# Patient Record
Sex: MALE | Race: WHITE | Employment: OTHER | URBAN - METROPOLITAN AREA
[De-identification: names, ages, dates, MRNs, and addresses within clinical notes are randomized per-mention and may not be internally consistent; named-entity substitution may affect disease eponyms.]

---

## 2017-05-15 PROBLEM — Z87.09 H/O BRONCHITIS: Status: ACTIVE | Noted: 2017-05-15

## 2017-11-15 PROBLEM — I47.1 NARROW COMPLEX TACHYCARDIA (HCC): Status: ACTIVE | Noted: 2017-11-15

## 2018-01-01 ENCOUNTER — HOSPITAL ENCOUNTER (OUTPATIENT)
Dept: INFUSION THERAPY | Age: 83
Setting detail: INFUSION SERIES
Discharge: HOME | End: 2018-05-15
Payer: MEDICARE

## 2018-01-01 ENCOUNTER — APPOINTMENT (OUTPATIENT)
Dept: GENERAL RADIOLOGY | Age: 83
DRG: 871 | End: 2018-01-01
Payer: MEDICARE

## 2018-01-01 ENCOUNTER — HOSPITAL ENCOUNTER (OUTPATIENT)
Dept: INFUSION THERAPY | Age: 83
Setting detail: INFUSION SERIES
Discharge: HOME | End: 2018-05-08
Payer: MEDICARE

## 2018-01-01 ENCOUNTER — HOSPITAL ENCOUNTER (OUTPATIENT)
Dept: INFUSION THERAPY | Age: 83
Setting detail: INFUSION SERIES
Discharge: HOME | End: 2018-05-01
Payer: MEDICARE

## 2018-01-01 ENCOUNTER — OFFICE VISIT (OUTPATIENT)
Dept: CARDIOLOGY CLINIC | Age: 83
End: 2018-01-01
Payer: MEDICARE

## 2018-01-01 ENCOUNTER — APPOINTMENT (OUTPATIENT)
Dept: ULTRASOUND IMAGING | Age: 83
DRG: 871 | End: 2018-01-01
Payer: MEDICARE

## 2018-01-01 ENCOUNTER — HOSPITAL ENCOUNTER (OUTPATIENT)
Dept: INFUSION THERAPY | Age: 83
Setting detail: INFUSION SERIES
Discharge: HOME | End: 2018-05-29
Payer: MEDICARE

## 2018-01-01 ENCOUNTER — HOSPITAL ENCOUNTER (OUTPATIENT)
Dept: INFUSION THERAPY | Age: 83
Setting detail: INFUSION SERIES
Discharge: HOME | End: 2018-06-05
Payer: MEDICARE

## 2018-01-01 ENCOUNTER — ANESTHESIA (OUTPATIENT)
Dept: ENDOSCOPY | Age: 83
DRG: 871 | End: 2018-01-01
Payer: MEDICARE

## 2018-01-01 ENCOUNTER — HOSPITAL ENCOUNTER (OUTPATIENT)
Dept: INFUSION THERAPY | Age: 83
Setting detail: INFUSION SERIES
Discharge: HOME | End: 2018-04-17
Payer: MEDICARE

## 2018-01-01 ENCOUNTER — HOSPITAL ENCOUNTER (OUTPATIENT)
Dept: INFUSION THERAPY | Age: 83
Setting detail: INFUSION SERIES
Discharge: HOME | End: 2018-03-27
Payer: MEDICARE

## 2018-01-01 ENCOUNTER — HOSPITAL ENCOUNTER (OUTPATIENT)
Dept: INFUSION THERAPY | Age: 83
Setting detail: INFUSION SERIES
Discharge: HOME | End: 2018-04-24
Payer: MEDICARE

## 2018-01-01 ENCOUNTER — TELEPHONE (OUTPATIENT)
Dept: CARDIOLOGY CLINIC | Age: 83
End: 2018-01-01

## 2018-01-01 ENCOUNTER — HOSPITAL ENCOUNTER (OUTPATIENT)
Dept: INFUSION THERAPY | Age: 83
Setting detail: INFUSION SERIES
Discharge: HOME | End: 2018-06-19
Payer: MEDICARE

## 2018-01-01 ENCOUNTER — ANESTHESIA EVENT (OUTPATIENT)
Dept: ENDOSCOPY | Age: 83
DRG: 871 | End: 2018-01-01
Payer: MEDICARE

## 2018-01-01 ENCOUNTER — HOSPITAL ENCOUNTER (OUTPATIENT)
Dept: INFUSION THERAPY | Age: 83
Setting detail: INFUSION SERIES
Discharge: HOME | End: 2018-06-26
Payer: MEDICARE

## 2018-01-01 ENCOUNTER — HOSPITAL ENCOUNTER (OUTPATIENT)
Dept: INFUSION THERAPY | Age: 83
Setting detail: INFUSION SERIES
Discharge: HOME | End: 2018-06-12
Payer: MEDICARE

## 2018-01-01 ENCOUNTER — HOSPITAL ENCOUNTER (INPATIENT)
Age: 83
LOS: 11 days | Discharge: NURSING, REHAB OR LONG-TERM CARE | DRG: 871 | End: 2018-12-14
Attending: EMERGENCY MEDICINE | Admitting: INTERNAL MEDICINE
Payer: MEDICARE

## 2018-01-01 ENCOUNTER — HOSPITAL ENCOUNTER (OUTPATIENT)
Dept: INFUSION THERAPY | Age: 83
Setting detail: INFUSION SERIES
Discharge: HOME | End: 2018-04-03
Payer: MEDICARE

## 2018-01-01 ENCOUNTER — HOSPITAL ENCOUNTER (OUTPATIENT)
Dept: INFUSION THERAPY | Age: 83
Setting detail: INFUSION SERIES
Discharge: HOME | End: 2018-05-22
Payer: MEDICARE

## 2018-01-01 ENCOUNTER — HOSPITAL ENCOUNTER (OUTPATIENT)
Dept: INFUSION THERAPY | Age: 83
Setting detail: INFUSION SERIES
Discharge: HOME | End: 2018-07-10
Payer: MEDICARE

## 2018-01-01 ENCOUNTER — HOSPITAL ENCOUNTER (OUTPATIENT)
Dept: INFUSION THERAPY | Age: 83
Setting detail: INFUSION SERIES
Discharge: HOME | End: 2018-03-20
Payer: MEDICARE

## 2018-01-01 ENCOUNTER — APPOINTMENT (OUTPATIENT)
Dept: CT IMAGING | Age: 83
DRG: 871 | End: 2018-01-01
Payer: MEDICARE

## 2018-01-01 ENCOUNTER — HOSPITAL ENCOUNTER (OUTPATIENT)
Dept: INFUSION THERAPY | Age: 83
Setting detail: INFUSION SERIES
Discharge: HOME | End: 2018-07-03
Payer: MEDICARE

## 2018-01-01 ENCOUNTER — HOSPITAL ENCOUNTER (OUTPATIENT)
Dept: INFUSION THERAPY | Age: 83
Setting detail: INFUSION SERIES
Discharge: HOME | End: 2018-04-10
Payer: MEDICARE

## 2018-01-01 VITALS
OXYGEN SATURATION: 95 % | SYSTOLIC BLOOD PRESSURE: 121 MMHG | BODY MASS INDEX: 28.62 KG/M2 | TEMPERATURE: 98.2 F | OXYGEN SATURATION: 96 % | HEART RATE: 76 BPM | WEIGHT: 172 LBS | SYSTOLIC BLOOD PRESSURE: 116 MMHG | WEIGHT: 173 LBS | RESPIRATION RATE: 16 BRPM | DIASTOLIC BLOOD PRESSURE: 58 MMHG | HEIGHT: 65 IN | TEMPERATURE: 97.8 F | BODY MASS INDEX: 28.82 KG/M2 | HEART RATE: 76 BPM | DIASTOLIC BLOOD PRESSURE: 56 MMHG | RESPIRATION RATE: 16 BRPM

## 2018-01-01 VITALS
WEIGHT: 176 LBS | TEMPERATURE: 98.1 F | HEART RATE: 108 BPM | BODY MASS INDEX: 29.32 KG/M2 | RESPIRATION RATE: 16 BRPM | HEIGHT: 65 IN | SYSTOLIC BLOOD PRESSURE: 108 MMHG | DIASTOLIC BLOOD PRESSURE: 58 MMHG | OXYGEN SATURATION: 96 %

## 2018-01-01 VITALS
WEIGHT: 175.8 LBS | HEIGHT: 65 IN | RESPIRATION RATE: 18 BRPM | SYSTOLIC BLOOD PRESSURE: 140 MMHG | HEART RATE: 76 BPM | DIASTOLIC BLOOD PRESSURE: 60 MMHG | BODY MASS INDEX: 29.29 KG/M2

## 2018-01-01 VITALS
RESPIRATION RATE: 18 BRPM | OXYGEN SATURATION: 95 % | HEART RATE: 108 BPM | TEMPERATURE: 99.1 F | DIASTOLIC BLOOD PRESSURE: 72 MMHG | SYSTOLIC BLOOD PRESSURE: 153 MMHG | BODY MASS INDEX: 25.11 KG/M2 | WEIGHT: 175 LBS

## 2018-01-01 VITALS
RESPIRATION RATE: 16 BRPM | HEART RATE: 88 BPM | BODY MASS INDEX: 25.34 KG/M2 | OXYGEN SATURATION: 94 % | TEMPERATURE: 98.5 F | SYSTOLIC BLOOD PRESSURE: 155 MMHG | WEIGHT: 177 LBS | DIASTOLIC BLOOD PRESSURE: 70 MMHG | HEIGHT: 70 IN

## 2018-01-01 VITALS
DIASTOLIC BLOOD PRESSURE: 70 MMHG | SYSTOLIC BLOOD PRESSURE: 164 MMHG | OXYGEN SATURATION: 94 % | RESPIRATION RATE: 16 BRPM | TEMPERATURE: 97.5 F | HEART RATE: 74 BPM

## 2018-01-01 VITALS
DIASTOLIC BLOOD PRESSURE: 60 MMHG | HEART RATE: 80 BPM | BODY MASS INDEX: 25.97 KG/M2 | OXYGEN SATURATION: 95 % | HEIGHT: 70 IN | TEMPERATURE: 98.4 F | HEART RATE: 86 BPM | TEMPERATURE: 98.5 F | DIASTOLIC BLOOD PRESSURE: 65 MMHG | WEIGHT: 175 LBS | WEIGHT: 181 LBS | BODY MASS INDEX: 25.05 KG/M2 | RESPIRATION RATE: 16 BRPM | OXYGEN SATURATION: 96 % | SYSTOLIC BLOOD PRESSURE: 132 MMHG | SYSTOLIC BLOOD PRESSURE: 146 MMHG | RESPIRATION RATE: 16 BRPM

## 2018-01-01 VITALS
TEMPERATURE: 98.4 F | HEART RATE: 64 BPM | HEIGHT: 65 IN | DIASTOLIC BLOOD PRESSURE: 79 MMHG | WEIGHT: 176 LBS | OXYGEN SATURATION: 95 % | BODY MASS INDEX: 29.32 KG/M2 | SYSTOLIC BLOOD PRESSURE: 129 MMHG | RESPIRATION RATE: 16 BRPM

## 2018-01-01 VITALS
HEIGHT: 65 IN | WEIGHT: 184 LBS | SYSTOLIC BLOOD PRESSURE: 130 MMHG | BODY MASS INDEX: 30.66 KG/M2 | RESPIRATION RATE: 16 BRPM | HEART RATE: 67 BPM | DIASTOLIC BLOOD PRESSURE: 50 MMHG

## 2018-01-01 VITALS
HEART RATE: 74 BPM | SYSTOLIC BLOOD PRESSURE: 139 MMHG | OXYGEN SATURATION: 95 % | WEIGHT: 173 LBS | DIASTOLIC BLOOD PRESSURE: 60 MMHG | TEMPERATURE: 98 F | BODY MASS INDEX: 28.82 KG/M2 | RESPIRATION RATE: 16 BRPM | HEIGHT: 65 IN

## 2018-01-01 VITALS
BODY MASS INDEX: 25.34 KG/M2 | SYSTOLIC BLOOD PRESSURE: 138 MMHG | HEART RATE: 94 BPM | RESPIRATION RATE: 16 BRPM | OXYGEN SATURATION: 94 % | HEIGHT: 70 IN | DIASTOLIC BLOOD PRESSURE: 67 MMHG | WEIGHT: 177 LBS | TEMPERATURE: 97.8 F

## 2018-01-01 VITALS
HEART RATE: 76 BPM | RESPIRATION RATE: 16 BRPM | WEIGHT: 173 LBS | HEIGHT: 65 IN | OXYGEN SATURATION: 98 % | TEMPERATURE: 98.7 F | BODY MASS INDEX: 28.82 KG/M2 | SYSTOLIC BLOOD PRESSURE: 149 MMHG | DIASTOLIC BLOOD PRESSURE: 70 MMHG

## 2018-01-01 VITALS
SYSTOLIC BLOOD PRESSURE: 132 MMHG | DIASTOLIC BLOOD PRESSURE: 62 MMHG | OXYGEN SATURATION: 96 % | HEART RATE: 78 BPM | RESPIRATION RATE: 16 BRPM | BODY MASS INDEX: 28.82 KG/M2 | HEIGHT: 65 IN | TEMPERATURE: 98.9 F | WEIGHT: 173 LBS

## 2018-01-01 VITALS
BODY MASS INDEX: 25.25 KG/M2 | OXYGEN SATURATION: 92 % | RESPIRATION RATE: 16 BRPM | DIASTOLIC BLOOD PRESSURE: 76 MMHG | WEIGHT: 176 LBS | TEMPERATURE: 98.7 F | SYSTOLIC BLOOD PRESSURE: 190 MMHG | HEART RATE: 88 BPM

## 2018-01-01 VITALS
DIASTOLIC BLOOD PRESSURE: 63 MMHG | HEIGHT: 70 IN | HEART RATE: 80 BPM | BODY MASS INDEX: 25.91 KG/M2 | RESPIRATION RATE: 16 BRPM | OXYGEN SATURATION: 93 % | SYSTOLIC BLOOD PRESSURE: 140 MMHG | TEMPERATURE: 98.7 F | WEIGHT: 181 LBS

## 2018-01-01 VITALS
DIASTOLIC BLOOD PRESSURE: 71 MMHG | HEIGHT: 65 IN | OXYGEN SATURATION: 96 % | SYSTOLIC BLOOD PRESSURE: 159 MMHG | RESPIRATION RATE: 16 BRPM | WEIGHT: 173.6 LBS | BODY MASS INDEX: 28.92 KG/M2 | TEMPERATURE: 98.1 F | HEART RATE: 88 BPM

## 2018-01-01 VITALS
HEART RATE: 99 BPM | DIASTOLIC BLOOD PRESSURE: 51 MMHG | BODY MASS INDEX: 30.19 KG/M2 | HEIGHT: 65 IN | WEIGHT: 181.2 LBS | RESPIRATION RATE: 18 BRPM | OXYGEN SATURATION: 98 % | SYSTOLIC BLOOD PRESSURE: 82 MMHG | TEMPERATURE: 98.6 F

## 2018-01-01 VITALS
DIASTOLIC BLOOD PRESSURE: 39 MMHG | OXYGEN SATURATION: 90 % | RESPIRATION RATE: 16 BRPM | SYSTOLIC BLOOD PRESSURE: 79 MMHG

## 2018-01-01 DIAGNOSIS — Q60.0 CONGENITAL ABSENCE OF ONE KIDNEY: ICD-10-CM

## 2018-01-01 DIAGNOSIS — N18.6 ESRD ON PERITONEAL DIALYSIS (HCC): ICD-10-CM

## 2018-01-01 DIAGNOSIS — E88.09 HYPOALBUMINEMIA: ICD-10-CM

## 2018-01-01 DIAGNOSIS — J96.01 ACUTE RESPIRATORY FAILURE WITH HYPOXIA (HCC): Primary | ICD-10-CM

## 2018-01-01 DIAGNOSIS — R60.0 BILATERAL LOWER EXTREMITY EDEMA: ICD-10-CM

## 2018-01-01 DIAGNOSIS — I34.0 NON-RHEUMATIC MITRAL REGURGITATION: ICD-10-CM

## 2018-01-01 DIAGNOSIS — E78.2 MIXED HYPERLIPIDEMIA: ICD-10-CM

## 2018-01-01 DIAGNOSIS — N18.6 ANEMIA IN CHRONIC KIDNEY DISEASE, ON CHRONIC DIALYSIS (HCC): ICD-10-CM

## 2018-01-01 DIAGNOSIS — E11.22 TYPE 2 DIABETES MELLITUS WITH STAGE 4 CHRONIC KIDNEY DISEASE, WITHOUT LONG-TERM CURRENT USE OF INSULIN (HCC): ICD-10-CM

## 2018-01-01 DIAGNOSIS — N28.9 MALNUTRITION DUE TO RENAL DISEASE (HCC): ICD-10-CM

## 2018-01-01 DIAGNOSIS — I36.1 NON-RHEUMATIC TRICUSPID VALVE INSUFFICIENCY: ICD-10-CM

## 2018-01-01 DIAGNOSIS — D63.8 ANEMIA OF CHRONIC DISEASE: ICD-10-CM

## 2018-01-01 DIAGNOSIS — N18.6 ANEMIA DUE TO CHRONIC KIDNEY DISEASE, ON CHRONIC DIALYSIS (HCC): ICD-10-CM

## 2018-01-01 DIAGNOSIS — I35.0 NONRHEUMATIC AORTIC VALVE STENOSIS: ICD-10-CM

## 2018-01-01 DIAGNOSIS — J45.30 MILD PERSISTENT ASTHMA WITHOUT COMPLICATION: ICD-10-CM

## 2018-01-01 DIAGNOSIS — D63.1 ANEMIA IN CHRONIC KIDNEY DISEASE, ON CHRONIC DIALYSIS (HCC): ICD-10-CM

## 2018-01-01 DIAGNOSIS — N18.4 TYPE 2 DIABETES MELLITUS WITH STAGE 4 CHRONIC KIDNEY DISEASE, WITHOUT LONG-TERM CURRENT USE OF INSULIN (HCC): ICD-10-CM

## 2018-01-01 DIAGNOSIS — Z87.09 H/O BRONCHITIS: ICD-10-CM

## 2018-01-01 DIAGNOSIS — I35.1 NONRHEUMATIC AORTIC VALVE INSUFFICIENCY: ICD-10-CM

## 2018-01-01 DIAGNOSIS — I47.1 PSVT (PAROXYSMAL SUPRAVENTRICULAR TACHYCARDIA) (HCC): ICD-10-CM

## 2018-01-01 DIAGNOSIS — I10 ESSENTIAL HYPERTENSION: Primary | ICD-10-CM

## 2018-01-01 DIAGNOSIS — N25.81 SECONDARY HYPERPARATHYROIDISM (HCC): ICD-10-CM

## 2018-01-01 DIAGNOSIS — I47.1 PAROXYSMAL SUPRAVENTRICULAR TACHYCARDIA (HCC): ICD-10-CM

## 2018-01-01 DIAGNOSIS — Z99.2 ESRD ON PERITONEAL DIALYSIS (HCC): ICD-10-CM

## 2018-01-01 DIAGNOSIS — I27.20 PULMONARY HTN (HCC): ICD-10-CM

## 2018-01-01 DIAGNOSIS — I38 VHD (VALVULAR HEART DISEASE): Primary | ICD-10-CM

## 2018-01-01 DIAGNOSIS — J45.40 MODERATE PERSISTENT ASTHMA WITHOUT COMPLICATION: ICD-10-CM

## 2018-01-01 DIAGNOSIS — E88.09 HYPERALBUMINEMIA: ICD-10-CM

## 2018-01-01 DIAGNOSIS — E66.9 MILD OBESITY: ICD-10-CM

## 2018-01-01 DIAGNOSIS — I38 VHD (VALVULAR HEART DISEASE): ICD-10-CM

## 2018-01-01 DIAGNOSIS — I48.91 ATRIAL FIBRILLATION WITH RAPID VENTRICULAR RESPONSE (HCC): ICD-10-CM

## 2018-01-01 DIAGNOSIS — Z99.2 ANEMIA DUE TO CHRONIC KIDNEY DISEASE, ON CHRONIC DIALYSIS (HCC): ICD-10-CM

## 2018-01-01 DIAGNOSIS — I10 ESSENTIAL HYPERTENSION: ICD-10-CM

## 2018-01-01 DIAGNOSIS — I47.1 NARROW COMPLEX TACHYCARDIA (HCC): ICD-10-CM

## 2018-01-01 DIAGNOSIS — D63.1 ANEMIA DUE TO CHRONIC KIDNEY DISEASE, ON CHRONIC DIALYSIS (HCC): ICD-10-CM

## 2018-01-01 DIAGNOSIS — Z99.2 ANEMIA IN CHRONIC KIDNEY DISEASE, ON CHRONIC DIALYSIS (HCC): ICD-10-CM

## 2018-01-01 DIAGNOSIS — E46 MALNUTRITION DUE TO RENAL DISEASE (HCC): ICD-10-CM

## 2018-01-01 LAB
ACETAMINOPHEN LEVEL: <5 MCG/ML (ref 10–30)
ALBUMIN SERPL-MCNC: 2 G/DL (ref 3.5–5.2)
ALBUMIN SERPL-MCNC: 2 G/DL (ref 3.5–5.2)
ALBUMIN SERPL-MCNC: 2.1 G/DL (ref 3.5–5.2)
ALBUMIN SERPL-MCNC: 2.1 G/DL (ref 3.5–5.2)
ALBUMIN SERPL-MCNC: 2.4 G/DL (ref 3.5–5.2)
ALBUMIN SERPL-MCNC: 2.4 G/DL (ref 3.5–5.2)
ALBUMIN SERPL-MCNC: 2.5 G/DL (ref 3.5–5.2)
ALBUMIN SERPL-MCNC: 2.6 G/DL (ref 3.5–5.2)
ALBUMIN SERPL-MCNC: 2.6 G/DL (ref 3.5–5.2)
ALBUMIN SERPL-MCNC: 2.7 G/DL (ref 3.5–5.2)
ALBUMIN SERPL-MCNC: 2.7 G/DL (ref 3.5–5.2)
ALBUMIN SERPL-MCNC: 2.8 G/DL (ref 3.5–5.2)
ALP BLD-CCNC: 51 U/L (ref 40–129)
ALP BLD-CCNC: 61 U/L (ref 40–129)
ALP BLD-CCNC: 64 U/L (ref 40–129)
ALP BLD-CCNC: 67 U/L (ref 40–129)
ALP BLD-CCNC: 67 U/L (ref 40–129)
ALP BLD-CCNC: 70 U/L (ref 40–129)
ALP BLD-CCNC: 72 U/L (ref 40–129)
ALP BLD-CCNC: 73 U/L (ref 40–129)
ALP BLD-CCNC: 82 U/L (ref 40–129)
ALT SERPL-CCNC: 10 U/L (ref 0–40)
ALT SERPL-CCNC: 11 U/L (ref 0–40)
ALT SERPL-CCNC: 11 U/L (ref 0–40)
ALT SERPL-CCNC: 12 U/L (ref 0–40)
ALT SERPL-CCNC: 12 U/L (ref 0–40)
ALT SERPL-CCNC: 13 U/L (ref 0–40)
ALT SERPL-CCNC: 16 U/L (ref 0–40)
ALT SERPL-CCNC: 17 U/L (ref 0–40)
AMORPHOUS: ABNORMAL
ANION GAP SERPL CALCULATED.3IONS-SCNC: 14 MMOL/L (ref 7–16)
ANION GAP SERPL CALCULATED.3IONS-SCNC: 14 MMOL/L (ref 7–16)
ANION GAP SERPL CALCULATED.3IONS-SCNC: 15 MMOL/L (ref 7–16)
ANION GAP SERPL CALCULATED.3IONS-SCNC: 16 MMOL/L (ref 7–16)
ANION GAP SERPL CALCULATED.3IONS-SCNC: 17 MMOL/L (ref 7–16)
ANION GAP SERPL CALCULATED.3IONS-SCNC: 17 MMOL/L (ref 7–16)
ANION GAP SERPL CALCULATED.3IONS-SCNC: 18 MMOL/L (ref 7–16)
ANION GAP SERPL CALCULATED.3IONS-SCNC: 18 MMOL/L (ref 7–16)
ANION GAP SERPL CALCULATED.3IONS-SCNC: 21 MMOL/L (ref 7–16)
ANISOCYTOSIS: ABNORMAL
APPEARANCE FLUID: CLEAR
APPEARANCE FLUID: CLEAR
APTT: 28.5 SEC (ref 24.5–35.1)
AST SERPL-CCNC: 14 U/L (ref 0–39)
AST SERPL-CCNC: 16 U/L (ref 0–39)
AST SERPL-CCNC: 17 U/L (ref 0–39)
AST SERPL-CCNC: 22 U/L (ref 0–39)
AST SERPL-CCNC: 5 U/L (ref 0–39)
AST SERPL-CCNC: 6 U/L (ref 0–39)
AST SERPL-CCNC: 6 U/L (ref 0–39)
AST SERPL-CCNC: 7 U/L (ref 0–39)
AST SERPL-CCNC: 8 U/L (ref 0–39)
AST SERPL-CCNC: 8 U/L (ref 0–39)
AST SERPL-CCNC: 9 U/L (ref 0–39)
AST SERPL-CCNC: 9 U/L (ref 0–39)
B.E.: -4.9 MMOL/L (ref -3–3)
BACTERIA: ABNORMAL /HPF
BASO FLUID: 0 %
BASOPHILIC STIPPLING: ABNORMAL
BASOPHILIC STIPPLING: ABNORMAL
BASOPHILS ABSOLUTE: 0 E9/L (ref 0–0.2)
BASOPHILS ABSOLUTE: 0.02 E9/L (ref 0–0.2)
BASOPHILS ABSOLUTE: 0.03 E9/L (ref 0–0.2)
BASOPHILS ABSOLUTE: 0.03 E9/L (ref 0–0.2)
BASOPHILS ABSOLUTE: 0.04 E9/L (ref 0–0.2)
BASOPHILS ABSOLUTE: 0.06 E9/L (ref 0–0.2)
BASOPHILS ABSOLUTE: 0.09 E9/L (ref 0–0.2)
BASOPHILS RELATIVE PERCENT: 0.1 % (ref 0–2)
BASOPHILS RELATIVE PERCENT: 0.1 % (ref 0–2)
BASOPHILS RELATIVE PERCENT: 0.2 % (ref 0–2)
BASOPHILS RELATIVE PERCENT: 0.3 % (ref 0–2)
BASOPHILS RELATIVE PERCENT: 0.9 % (ref 0–2)
BILIRUB SERPL-MCNC: 0.2 MG/DL (ref 0–1.2)
BILIRUB SERPL-MCNC: 0.3 MG/DL (ref 0–1.2)
BILIRUB SERPL-MCNC: 0.4 MG/DL (ref 0–1.2)
BILIRUBIN URINE: NEGATIVE
BLOOD CULTURE, ROUTINE: NORMAL
BLOOD, URINE: ABNORMAL
BODY FLUID CULTURE, STERILE: NORMAL
BUN BLDV-MCNC: 65 MG/DL (ref 8–23)
BUN BLDV-MCNC: 66 MG/DL (ref 8–23)
BUN BLDV-MCNC: 67 MG/DL (ref 8–23)
BUN BLDV-MCNC: 68 MG/DL (ref 8–23)
BUN BLDV-MCNC: 69 MG/DL (ref 8–23)
BUN BLDV-MCNC: 69 MG/DL (ref 8–23)
BUN BLDV-MCNC: 71 MG/DL (ref 8–23)
BUN BLDV-MCNC: 71 MG/DL (ref 8–23)
BUN BLDV-MCNC: 72 MG/DL (ref 8–23)
BUN BLDV-MCNC: 74 MG/DL (ref 8–23)
BUN BLDV-MCNC: 74 MG/DL (ref 8–23)
BUN BLDV-MCNC: 86 MG/DL (ref 8–23)
BUN BLDV-MCNC: 95 MG/DL (ref 8–23)
CALCIUM SERPL-MCNC: 8 MG/DL (ref 8.6–10.2)
CALCIUM SERPL-MCNC: 8 MG/DL (ref 8.6–10.2)
CALCIUM SERPL-MCNC: 8.2 MG/DL (ref 8.6–10.2)
CALCIUM SERPL-MCNC: 8.3 MG/DL (ref 8.6–10.2)
CALCIUM SERPL-MCNC: 8.4 MG/DL (ref 8.6–10.2)
CALCIUM SERPL-MCNC: 8.4 MG/DL (ref 8.6–10.2)
CALCIUM SERPL-MCNC: 8.5 MG/DL (ref 8.6–10.2)
CALCIUM SERPL-MCNC: 8.5 MG/DL (ref 8.6–10.2)
CALCIUM SERPL-MCNC: 8.6 MG/DL (ref 8.6–10.2)
CALCIUM SERPL-MCNC: 8.8 MG/DL (ref 8.6–10.2)
CALCIUM SERPL-MCNC: 8.8 MG/DL (ref 8.6–10.2)
CELL COUNT FLUID TYPE: NORMAL
CELL COUNT FLUID TYPE: NORMAL
CHLORIDE BLD-SCNC: 87 MMOL/L (ref 98–107)
CHLORIDE BLD-SCNC: 90 MMOL/L (ref 98–107)
CHLORIDE BLD-SCNC: 91 MMOL/L (ref 98–107)
CHLORIDE BLD-SCNC: 92 MMOL/L (ref 98–107)
CHLORIDE BLD-SCNC: 93 MMOL/L (ref 98–107)
CHLORIDE BLD-SCNC: 94 MMOL/L (ref 98–107)
CHLORIDE BLD-SCNC: 94 MMOL/L (ref 98–107)
CHLORIDE BLD-SCNC: 95 MMOL/L (ref 98–107)
CHLORIDE BLD-SCNC: 96 MMOL/L (ref 98–107)
CHOLESTEROL, TOTAL: 104 MG/DL (ref 0–199)
CK MB: 2.8 NG/ML (ref 0–7.7)
CK MB: 2.9 NG/ML (ref 0–7.7)
CLARITY: ABNORMAL
CO2: 22 MMOL/L (ref 22–29)
CO2: 23 MMOL/L (ref 22–29)
CO2: 23 MMOL/L (ref 22–29)
CO2: 24 MMOL/L (ref 22–29)
CO2: 24 MMOL/L (ref 22–29)
CO2: 26 MMOL/L (ref 22–29)
CO2: 27 MMOL/L (ref 22–29)
COHB: 0.5 % (ref 0–1.5)
COLOR FLUID: COLORLESS
COLOR FLUID: NORMAL
COLOR: YELLOW
COMMENT: NORMAL
CORTISOL TOTAL: 27.59 MCG/DL (ref 2.68–18.4)
CORTISOL TOTAL: 28.62 MCG/DL (ref 2.68–18.4)
CREAT SERPL-MCNC: 6.2 MG/DL (ref 0.7–1.2)
CREAT SERPL-MCNC: 6.4 MG/DL (ref 0.7–1.2)
CREAT SERPL-MCNC: 6.5 MG/DL (ref 0.7–1.2)
CREAT SERPL-MCNC: 6.5 MG/DL (ref 0.7–1.2)
CREAT SERPL-MCNC: 6.6 MG/DL (ref 0.7–1.2)
CREAT SERPL-MCNC: 6.8 MG/DL (ref 0.7–1.2)
CREAT SERPL-MCNC: 6.8 MG/DL (ref 0.7–1.2)
CREAT SERPL-MCNC: 7.1 MG/DL (ref 0.7–1.2)
CREAT SERPL-MCNC: 7.2 MG/DL (ref 0.7–1.2)
CREAT SERPL-MCNC: 7.3 MG/DL (ref 0.7–1.2)
CREAT SERPL-MCNC: 7.4 MG/DL (ref 0.7–1.2)
CREAT SERPL-MCNC: 7.4 MG/DL (ref 0.7–1.2)
CREAT SERPL-MCNC: 7.9 MG/DL (ref 0.7–1.2)
CRITICAL: ABNORMAL
CULTURE CATHETER TIP: NORMAL
CULTURE, BLOOD 2: ABNORMAL
CULTURE, BLOOD 2: ABNORMAL
CULTURE, BLOOD 2: NORMAL
CULTURE, RESPIRATORY: ABNORMAL
CULTURE, RESPIRATORY: ABNORMAL
DATE ANALYZED: ABNORMAL
DATE OF COLLECTION: ABNORMAL
DOHLE BODIES: ABNORMAL
EKG ATRIAL RATE: 115 BPM
EKG ATRIAL RATE: 133 BPM
EKG Q-T INTERVAL: 326 MS
EKG Q-T INTERVAL: 346 MS
EKG QRS DURATION: 112 MS
EKG QRS DURATION: 114 MS
EKG QTC CALCULATION (BAZETT): 450 MS
EKG QTC CALCULATION (BAZETT): 460 MS
EKG R AXIS: -25 DEGREES
EKG R AXIS: 0 DEGREES
EKG T AXIS: 67 DEGREES
EKG T AXIS: 81 DEGREES
EKG VENTRICULAR RATE: 102 BPM
EKG VENTRICULAR RATE: 120 BPM
EOSINOPHIL FLUID: 0 %
EOSINOPHILS ABSOLUTE: 0 E9/L (ref 0.05–0.5)
EOSINOPHILS ABSOLUTE: 0.09 E9/L (ref 0.05–0.5)
EOSINOPHILS ABSOLUTE: 0.1 E9/L (ref 0.05–0.5)
EOSINOPHILS ABSOLUTE: 0.11 E9/L (ref 0.05–0.5)
EOSINOPHILS ABSOLUTE: 0.11 E9/L (ref 0.05–0.5)
EOSINOPHILS ABSOLUTE: 0.12 E9/L (ref 0.05–0.5)
EOSINOPHILS ABSOLUTE: 0.13 E9/L (ref 0.05–0.5)
EOSINOPHILS ABSOLUTE: 0.18 E9/L (ref 0.05–0.5)
EOSINOPHILS ABSOLUTE: 0.2 E9/L (ref 0.05–0.5)
EOSINOPHILS RELATIVE PERCENT: 0 % (ref 0–6)
EOSINOPHILS RELATIVE PERCENT: 0.1 % (ref 0–6)
EOSINOPHILS RELATIVE PERCENT: 0.2 % (ref 0–6)
EOSINOPHILS RELATIVE PERCENT: 0.5 % (ref 0–6)
EOSINOPHILS RELATIVE PERCENT: 0.7 % (ref 0–6)
EOSINOPHILS RELATIVE PERCENT: 0.8 % (ref 0–6)
EOSINOPHILS RELATIVE PERCENT: 0.9 % (ref 0–6)
EOSINOPHILS RELATIVE PERCENT: 1 % (ref 0–6)
EOSINOPHILS RELATIVE PERCENT: 1.7 % (ref 0–6)
EPITHELIAL CELLS, UA: ABNORMAL /HPF
ETHANOL: <10 MG/DL (ref 0–0.08)
FERRITIN: 1232 NG/ML
FILM ARRAY ADENOVIRUS: NORMAL
FILM ARRAY BORDETELLA PERTUSSIS: NORMAL
FILM ARRAY CHLAMYDOPHILIA PNEUMONIAE: NORMAL
FILM ARRAY CORONAVIRUS 229E: NORMAL
FILM ARRAY CORONAVIRUS HKU1: NORMAL
FILM ARRAY CORONAVIRUS NL63: NORMAL
FILM ARRAY CORONAVIRUS OC43: NORMAL
FILM ARRAY INFLUENZA A VIRUS 09H1: NORMAL
FILM ARRAY INFLUENZA A VIRUS H1: NORMAL
FILM ARRAY INFLUENZA A VIRUS H3: NORMAL
FILM ARRAY INFLUENZA A VIRUS: NORMAL
FILM ARRAY INFLUENZA B: NORMAL
FILM ARRAY METAPNEUMOVIRUS: NORMAL
FILM ARRAY MYCOPLASMA PNEUMONIAE: NORMAL
FILM ARRAY PARAINFLUENZA VIRUS 1: NORMAL
FILM ARRAY PARAINFLUENZA VIRUS 2: NORMAL
FILM ARRAY PARAINFLUENZA VIRUS 3: NORMAL
FILM ARRAY PARAINFLUENZA VIRUS 4: NORMAL
FILM ARRAY RESPIRATORY SYNCITIAL VIRUS: NORMAL
FILM ARRAY RHINOVIRUS/ENTEROVIRUS: NORMAL
GFR AFRICAN AMERICAN: 10
GFR AFRICAN AMERICAN: 8
GFR AFRICAN AMERICAN: 9
GFR NON-AFRICAN AMERICAN: 7 ML/MIN/1.73
GFR NON-AFRICAN AMERICAN: 8 ML/MIN/1.73
GFR NON-AFRICAN AMERICAN: 9 ML/MIN/1.73
GLUCOSE BLD-MCNC: 159 MG/DL (ref 74–99)
GLUCOSE BLD-MCNC: 162 MG/DL (ref 74–99)
GLUCOSE BLD-MCNC: 177 MG/DL (ref 74–99)
GLUCOSE BLD-MCNC: 181 MG/DL (ref 74–99)
GLUCOSE BLD-MCNC: 191 MG/DL (ref 74–99)
GLUCOSE BLD-MCNC: 232 MG/DL (ref 74–99)
GLUCOSE BLD-MCNC: 233 MG/DL (ref 74–99)
GLUCOSE BLD-MCNC: 253 MG/DL (ref 74–99)
GLUCOSE BLD-MCNC: 262 MG/DL (ref 74–99)
GLUCOSE BLD-MCNC: 263 MG/DL (ref 74–99)
GLUCOSE BLD-MCNC: 265 MG/DL (ref 74–99)
GLUCOSE BLD-MCNC: 289 MG/DL (ref 74–99)
GLUCOSE BLD-MCNC: 368 MG/DL (ref 74–99)
GLUCOSE URINE: 250 MG/DL
GRAM STAIN ORDERABLE: NORMAL
GRAM STAIN RESULT: NORMAL
HAV IGM SER IA-ACNC: NORMAL
HBA1C MFR BLD: 6.9 % (ref 4–5.6)
HBV SURFACE AB TITR SER: NORMAL {TITER}
HCO3: 19.3 MMOL/L (ref 22–26)
HCT VFR BLD CALC: 26.9 % (ref 37–54)
HCT VFR BLD CALC: 27.5 % (ref 37–54)
HCT VFR BLD CALC: 28.5 % (ref 37–54)
HCT VFR BLD CALC: 28.6 % (ref 37–54)
HCT VFR BLD CALC: 28.7 % (ref 37–54)
HCT VFR BLD CALC: 29 % (ref 37–54)
HCT VFR BLD CALC: 29.3 % (ref 37–54)
HCT VFR BLD CALC: 29.6 % (ref 37–54)
HCT VFR BLD CALC: 29.6 % (ref 37–54)
HCT VFR BLD CALC: 29.7 % (ref 37–54)
HCT VFR BLD CALC: 30.3 % (ref 37–54)
HCT VFR BLD CALC: 31.1 % (ref 37–54)
HCT VFR BLD CALC: 34.7 % (ref 37–54)
HDLC SERPL-MCNC: 62 MG/DL
HEMOGLOBIN: 11.5 G/DL (ref 12.5–16.5)
HEMOGLOBIN: 8 G/DL (ref 12.5–16.5)
HEMOGLOBIN: 8.4 G/DL (ref 12.5–16.5)
HEMOGLOBIN: 8.8 G/DL (ref 12.5–16.5)
HEMOGLOBIN: 8.9 G/DL (ref 12.5–16.5)
HEMOGLOBIN: 9.1 G/DL (ref 12.5–16.5)
HEMOGLOBIN: 9.2 G/DL (ref 12.5–16.5)
HEMOGLOBIN: 9.3 G/DL (ref 12.5–16.5)
HEMOGLOBIN: 9.4 G/DL (ref 12.5–16.5)
HEMOGLOBIN: 9.4 G/DL (ref 12.5–16.5)
HEMOGLOBIN: 9.7 G/DL (ref 12.5–16.5)
HEPATITIS B CORE IGM ANTIBODY: NORMAL
HEPATITIS B SURFACE ANTIGEN INTERPRETATION: NORMAL
HEPATITIS C ANTIBODY INTERPRETATION: NORMAL
HHB: 1.4 % (ref 0–5)
HYPOCHROMIA: ABNORMAL
IMMATURE GRANULOCYTES #: 0.09 E9/L
IMMATURE GRANULOCYTES #: 0.13 E9/L
IMMATURE GRANULOCYTES #: 0.13 E9/L
IMMATURE GRANULOCYTES #: 0.18 E9/L
IMMATURE GRANULOCYTES #: 0.24 E9/L
IMMATURE GRANULOCYTES %: 0.7 % (ref 0–5)
IMMATURE GRANULOCYTES %: 0.9 % (ref 0–5)
IMMATURE GRANULOCYTES %: 1 % (ref 0–5)
IMMATURE GRANULOCYTES %: 1.1 % (ref 0–5)
IMMATURE GRANULOCYTES %: 1.3 % (ref 0–5)
INR BLD: 1.1
INR BLD: 1.2
INR BLD: 1.2
INR BLD: 1.3
IRON SATURATION: 31 % (ref 20–55)
IRON: 37 MCG/DL (ref 59–158)
KETONES, URINE: NEGATIVE MG/DL
L. PNEUMOPHILA SEROGP 1 UR AG: NORMAL
LAB: ABNORMAL
LACTIC ACID: 1.7 MMOL/L (ref 0.5–2.2)
LACTIC ACID: 2.1 MMOL/L (ref 0.5–2.2)
LACTIC ACID: 2.2 MMOL/L (ref 0.5–2.2)
LACTIC ACID: 2.7 MMOL/L (ref 0.5–2.2)
LACTIC ACID: 2.7 MMOL/L (ref 0.5–2.2)
LACTIC ACID: 2.9 MMOL/L (ref 0.5–2.2)
LACTIC ACID: 3.9 MMOL/L (ref 0.5–2.2)
LDL CHOLESTEROL CALCULATED: 26 MG/DL (ref 0–99)
LEUKOCYTE ESTERASE, URINE: NEGATIVE
LIPASE: 25 U/L (ref 13–60)
LV EF: 55 %
LVEF MODALITY: NORMAL
LYMPHOCYTES ABSOLUTE: 0.82 E9/L (ref 1.5–4)
LYMPHOCYTES ABSOLUTE: 0.98 E9/L (ref 1.5–4)
LYMPHOCYTES ABSOLUTE: 1.37 E9/L (ref 1.5–4)
LYMPHOCYTES ABSOLUTE: 1.61 E9/L (ref 1.5–4)
LYMPHOCYTES ABSOLUTE: 1.71 E9/L (ref 1.5–4)
LYMPHOCYTES ABSOLUTE: 1.74 E9/L (ref 1.5–4)
LYMPHOCYTES ABSOLUTE: 1.78 E9/L (ref 1.5–4)
LYMPHOCYTES ABSOLUTE: 1.79 E9/L (ref 1.5–4)
LYMPHOCYTES ABSOLUTE: 1.84 E9/L (ref 1.5–4)
LYMPHOCYTES ABSOLUTE: 2.04 E9/L (ref 1.5–4)
LYMPHOCYTES ABSOLUTE: 2.04 E9/L (ref 1.5–4)
LYMPHOCYTES ABSOLUTE: 2.24 E9/L (ref 1.5–4)
LYMPHOCYTES ABSOLUTE: 2.4 E9/L (ref 1.5–4)
LYMPHOCYTES RELATIVE PERCENT: 11 % (ref 20–42)
LYMPHOCYTES RELATIVE PERCENT: 11.4 % (ref 20–42)
LYMPHOCYTES RELATIVE PERCENT: 11.9 % (ref 20–42)
LYMPHOCYTES RELATIVE PERCENT: 12.2 % (ref 20–42)
LYMPHOCYTES RELATIVE PERCENT: 12.3 % (ref 20–42)
LYMPHOCYTES RELATIVE PERCENT: 13.1 % (ref 20–42)
LYMPHOCYTES RELATIVE PERCENT: 13.2 % (ref 20–42)
LYMPHOCYTES RELATIVE PERCENT: 13.9 % (ref 20–42)
LYMPHOCYTES RELATIVE PERCENT: 16.3 % (ref 20–42)
LYMPHOCYTES RELATIVE PERCENT: 5.2 % (ref 20–42)
LYMPHOCYTES RELATIVE PERCENT: 6.1 % (ref 20–42)
LYMPHOCYTES RELATIVE PERCENT: 7.8 % (ref 20–42)
LYMPHOCYTES RELATIVE PERCENT: 7.9 % (ref 20–42)
LYMPHOCYTES, BODY FLUID: 8 %
Lab: ABNORMAL
MAGNESIUM: 1.7 MG/DL (ref 1.6–2.6)
MAGNESIUM: 1.7 MG/DL (ref 1.6–2.6)
MAGNESIUM: 1.8 MG/DL (ref 1.6–2.6)
MAGNESIUM: 1.9 MG/DL (ref 1.6–2.6)
MAGNESIUM: 1.9 MG/DL (ref 1.6–2.6)
MAGNESIUM: 2 MG/DL (ref 1.6–2.6)
MAGNESIUM: 2.1 MG/DL (ref 1.6–2.6)
MAGNESIUM: 2.2 MG/DL (ref 1.6–2.6)
MAGNESIUM: 2.3 MG/DL (ref 1.6–2.6)
MCH RBC QN AUTO: 30.3 PG (ref 26–35)
MCH RBC QN AUTO: 30.4 PG (ref 26–35)
MCH RBC QN AUTO: 30.5 PG (ref 26–35)
MCH RBC QN AUTO: 30.5 PG (ref 26–35)
MCH RBC QN AUTO: 30.7 PG (ref 26–35)
MCH RBC QN AUTO: 31.2 PG (ref 26–35)
MCHC RBC AUTO-ENTMCNC: 29.5 % (ref 32–34.5)
MCHC RBC AUTO-ENTMCNC: 29.7 % (ref 32–34.5)
MCHC RBC AUTO-ENTMCNC: 29.7 % (ref 32–34.5)
MCHC RBC AUTO-ENTMCNC: 30 % (ref 32–34.5)
MCHC RBC AUTO-ENTMCNC: 30.1 % (ref 32–34.5)
MCHC RBC AUTO-ENTMCNC: 30.2 % (ref 32–34.5)
MCHC RBC AUTO-ENTMCNC: 31 % (ref 32–34.5)
MCHC RBC AUTO-ENTMCNC: 31.7 % (ref 32–34.5)
MCHC RBC AUTO-ENTMCNC: 31.8 % (ref 32–34.5)
MCHC RBC AUTO-ENTMCNC: 32.4 % (ref 32–34.5)
MCHC RBC AUTO-ENTMCNC: 32.4 % (ref 32–34.5)
MCHC RBC AUTO-ENTMCNC: 33.1 % (ref 32–34.5)
MCHC RBC AUTO-ENTMCNC: 33.1 % (ref 32–34.5)
MCV RBC AUTO: 100.3 FL (ref 80–99.9)
MCV RBC AUTO: 101.4 FL (ref 80–99.9)
MCV RBC AUTO: 101.7 FL (ref 80–99.9)
MCV RBC AUTO: 102.1 FL (ref 80–99.9)
MCV RBC AUTO: 102.3 FL (ref 80–99.9)
MCV RBC AUTO: 102.9 FL (ref 80–99.9)
MCV RBC AUTO: 92.5 FL (ref 80–99.9)
MCV RBC AUTO: 94.2 FL (ref 80–99.9)
MCV RBC AUTO: 94.7 FL (ref 80–99.9)
MCV RBC AUTO: 94.8 FL (ref 80–99.9)
MCV RBC AUTO: 95.3 FL (ref 80–99.9)
MCV RBC AUTO: 95.7 FL (ref 80–99.9)
MCV RBC AUTO: 98.1 FL (ref 80–99.9)
METAMYELOCYTES RELATIVE PERCENT: 1.8 % (ref 0–1)
METER GLUCOSE: 114 MG/DL (ref 74–99)
METER GLUCOSE: 118 MG/DL (ref 74–99)
METER GLUCOSE: 122 MG/DL (ref 74–99)
METER GLUCOSE: 124 MG/DL (ref 74–99)
METER GLUCOSE: 136 MG/DL (ref 74–99)
METER GLUCOSE: 137 MG/DL (ref 74–99)
METER GLUCOSE: 138 MG/DL (ref 74–99)
METER GLUCOSE: 148 MG/DL (ref 74–99)
METER GLUCOSE: 149 MG/DL (ref 74–99)
METER GLUCOSE: 152 MG/DL (ref 74–99)
METER GLUCOSE: 153 MG/DL (ref 74–99)
METER GLUCOSE: 153 MG/DL (ref 74–99)
METER GLUCOSE: 155 MG/DL (ref 74–99)
METER GLUCOSE: 156 MG/DL (ref 74–99)
METER GLUCOSE: 161 MG/DL (ref 74–99)
METER GLUCOSE: 161 MG/DL (ref 74–99)
METER GLUCOSE: 165 MG/DL (ref 74–99)
METER GLUCOSE: 168 MG/DL (ref 74–99)
METER GLUCOSE: 169 MG/DL (ref 74–99)
METER GLUCOSE: 171 MG/DL (ref 74–99)
METER GLUCOSE: 175 MG/DL (ref 74–99)
METER GLUCOSE: 177 MG/DL (ref 74–99)
METER GLUCOSE: 180 MG/DL (ref 74–99)
METER GLUCOSE: 180 MG/DL (ref 74–99)
METER GLUCOSE: 181 MG/DL (ref 74–99)
METER GLUCOSE: 183 MG/DL (ref 74–99)
METER GLUCOSE: 192 MG/DL (ref 74–99)
METER GLUCOSE: 193 MG/DL (ref 74–99)
METER GLUCOSE: 195 MG/DL (ref 74–99)
METER GLUCOSE: 201 MG/DL (ref 74–99)
METER GLUCOSE: 202 MG/DL (ref 74–99)
METER GLUCOSE: 207 MG/DL (ref 74–99)
METER GLUCOSE: 214 MG/DL (ref 74–99)
METER GLUCOSE: 215 MG/DL (ref 74–99)
METER GLUCOSE: 216 MG/DL (ref 74–99)
METER GLUCOSE: 220 MG/DL (ref 74–99)
METER GLUCOSE: 224 MG/DL (ref 74–99)
METER GLUCOSE: 227 MG/DL (ref 74–99)
METER GLUCOSE: 228 MG/DL (ref 74–99)
METER GLUCOSE: 232 MG/DL (ref 74–99)
METER GLUCOSE: 234 MG/DL (ref 74–99)
METER GLUCOSE: 236 MG/DL (ref 74–99)
METER GLUCOSE: 254 MG/DL (ref 74–99)
METER GLUCOSE: 273 MG/DL (ref 74–99)
METER GLUCOSE: 276 MG/DL (ref 74–99)
METER GLUCOSE: 321 MG/DL (ref 74–99)
METER GLUCOSE: 72 MG/DL (ref 74–99)
METHB: 0.1 % (ref 0–1.5)
MODE: ABNORMAL
MONOCYTE, FLUID: 65 %
MONOCYTE, FLUID: 67 %
MONOCYTES ABSOLUTE: 0 E9/L (ref 0.1–0.95)
MONOCYTES ABSOLUTE: 0.51 E9/L (ref 0.1–0.95)
MONOCYTES ABSOLUTE: 0.58 E9/L (ref 0.1–0.95)
MONOCYTES ABSOLUTE: 0.65 E9/L (ref 0.1–0.95)
MONOCYTES ABSOLUTE: 0.82 E9/L (ref 0.1–0.95)
MONOCYTES ABSOLUTE: 0.9 E9/L (ref 0.1–0.95)
MONOCYTES ABSOLUTE: 1.01 E9/L (ref 0.1–0.95)
MONOCYTES ABSOLUTE: 1.07 E9/L (ref 0.1–0.95)
MONOCYTES ABSOLUTE: 1.13 E9/L (ref 0.1–0.95)
MONOCYTES ABSOLUTE: 1.25 E9/L (ref 0.1–0.95)
MONOCYTES ABSOLUTE: 1.28 E9/L (ref 0.1–0.95)
MONOCYTES ABSOLUTE: 1.41 E9/L (ref 0.1–0.95)
MONOCYTES ABSOLUTE: 1.52 E9/L (ref 0.1–0.95)
MONOCYTES RELATIVE PERCENT: 1.8 % (ref 2–12)
MONOCYTES RELATIVE PERCENT: 10.4 % (ref 2–12)
MONOCYTES RELATIVE PERCENT: 11.3 % (ref 2–12)
MONOCYTES RELATIVE PERCENT: 2.6 % (ref 2–12)
MONOCYTES RELATIVE PERCENT: 2.6 % (ref 2–12)
MONOCYTES RELATIVE PERCENT: 2.7 % (ref 2–12)
MONOCYTES RELATIVE PERCENT: 6.4 % (ref 2–12)
MONOCYTES RELATIVE PERCENT: 7.4 % (ref 2–12)
MONOCYTES RELATIVE PERCENT: 7.5 % (ref 2–12)
MONOCYTES RELATIVE PERCENT: 7.8 % (ref 2–12)
MONOCYTES RELATIVE PERCENT: 7.9 % (ref 2–12)
MONOCYTES RELATIVE PERCENT: 8.3 % (ref 2–12)
MONOCYTES RELATIVE PERCENT: 8.6 % (ref 2–12)
MYCOPLASMA PNEUMONIAE IGM: NORMAL
MYELOCYTE PERCENT: 0.9 % (ref 0–0)
NEUTROPHIL, FLUID: 27 %
NEUTROPHIL, FLUID: 33 %
NEUTROPHILS ABSOLUTE: 10.02 E9/L (ref 1.8–7.3)
NEUTROPHILS ABSOLUTE: 10.35 E9/L (ref 1.8–7.3)
NEUTROPHILS ABSOLUTE: 10.65 E9/L (ref 1.8–7.3)
NEUTROPHILS ABSOLUTE: 11.23 E9/L (ref 1.8–7.3)
NEUTROPHILS ABSOLUTE: 13.34 E9/L (ref 1.8–7.3)
NEUTROPHILS ABSOLUTE: 14.15 E9/L (ref 1.8–7.3)
NEUTROPHILS ABSOLUTE: 17.94 E9/L (ref 1.8–7.3)
NEUTROPHILS ABSOLUTE: 18.53 E9/L (ref 1.8–7.3)
NEUTROPHILS ABSOLUTE: 21.43 E9/L (ref 1.8–7.3)
NEUTROPHILS ABSOLUTE: 22.95 E9/L (ref 1.8–7.3)
NEUTROPHILS ABSOLUTE: 8.45 E9/L (ref 1.8–7.3)
NEUTROPHILS ABSOLUTE: 8.78 E9/L (ref 1.8–7.3)
NEUTROPHILS ABSOLUTE: 9.6 E9/L (ref 1.8–7.3)
NEUTROPHILS RELATIVE PERCENT: 70.4 % (ref 43–80)
NEUTROPHILS RELATIVE PERCENT: 74.8 % (ref 43–80)
NEUTROPHILS RELATIVE PERCENT: 76.7 % (ref 43–80)
NEUTROPHILS RELATIVE PERCENT: 76.8 % (ref 43–80)
NEUTROPHILS RELATIVE PERCENT: 77.4 % (ref 43–80)
NEUTROPHILS RELATIVE PERCENT: 78.9 % (ref 43–80)
NEUTROPHILS RELATIVE PERCENT: 79.4 % (ref 43–80)
NEUTROPHILS RELATIVE PERCENT: 79.5 % (ref 43–80)
NEUTROPHILS RELATIVE PERCENT: 80.9 % (ref 43–80)
NEUTROPHILS RELATIVE PERCENT: 85.1 % (ref 43–80)
NEUTROPHILS RELATIVE PERCENT: 87.7 % (ref 43–80)
NEUTROPHILS RELATIVE PERCENT: 92.2 % (ref 43–80)
NEUTROPHILS RELATIVE PERCENT: 93.9 % (ref 43–80)
NITRITE, URINE: NEGATIVE
NUCLEATED CELLS FLUID: 20 /UL
NUCLEATED CELLS FLUID: 30 /UL
O2 CONTENT: 16.8 ML/DL
O2 SATURATION: 98.6 % (ref 92–98.5)
O2HB: 98 % (ref 94–97)
OPERATOR ID: 421
ORGANISM: ABNORMAL
PATHOLOGIST REVIEW: NORMAL
PATIENT TEMP: 37 C
PCO2: 33 MMHG (ref 35–45)
PDW BLD-RTO: 13.4 FL (ref 11.5–15)
PDW BLD-RTO: 13.6 FL (ref 11.5–15)
PDW BLD-RTO: 13.8 FL (ref 11.5–15)
PDW BLD-RTO: 13.9 FL (ref 11.5–15)
PDW BLD-RTO: 13.9 FL (ref 11.5–15)
PDW BLD-RTO: 14 FL (ref 11.5–15)
PDW BLD-RTO: 14.1 FL (ref 11.5–15)
PDW BLD-RTO: 14.2 FL (ref 11.5–15)
PDW BLD-RTO: 14.2 FL (ref 11.5–15)
PH BLOOD GAS: 7.38 (ref 7.35–7.45)
PH UA: 5.5 (ref 5–9)
PHOSPHORUS: 4.1 MG/DL (ref 2.5–4.5)
PHOSPHORUS: 4.1 MG/DL (ref 2.5–4.5)
PHOSPHORUS: 4.5 MG/DL (ref 2.5–4.5)
PHOSPHORUS: 4.5 MG/DL (ref 2.5–4.5)
PHOSPHORUS: 5 MG/DL (ref 2.5–4.5)
PHOSPHORUS: 5.1 MG/DL (ref 2.5–4.5)
PHOSPHORUS: 5.4 MG/DL (ref 2.5–4.5)
PHOSPHORUS: 6 MG/DL (ref 2.5–4.5)
PHOSPHORUS: 6.3 MG/DL (ref 2.5–4.5)
PHOSPHORUS: 6.7 MG/DL (ref 2.5–4.5)
PHOSPHORUS: 7.5 MG/DL (ref 2.5–4.5)
PLATELET # BLD: 106 E9/L (ref 130–450)
PLATELET # BLD: 107 E9/L (ref 130–450)
PLATELET # BLD: 121 E9/L (ref 130–450)
PLATELET # BLD: 128 E9/L (ref 130–450)
PLATELET # BLD: 148 E9/L (ref 130–450)
PLATELET # BLD: 164 E9/L (ref 130–450)
PLATELET # BLD: 164 E9/L (ref 130–450)
PLATELET # BLD: 172 E9/L (ref 130–450)
PLATELET # BLD: 203 E9/L (ref 130–450)
PLATELET # BLD: 91 E9/L (ref 130–450)
PLATELET # BLD: 93 E9/L (ref 130–450)
PLATELET # BLD: 97 E9/L (ref 130–450)
PLATELET # BLD: 99 E9/L (ref 130–450)
PLATELET CONFIRMATION: NORMAL
PMV BLD AUTO: 10 FL (ref 7–12)
PMV BLD AUTO: 10 FL (ref 7–12)
PMV BLD AUTO: 10.1 FL (ref 7–12)
PMV BLD AUTO: 10.2 FL (ref 7–12)
PMV BLD AUTO: 10.3 FL (ref 7–12)
PMV BLD AUTO: 9.2 FL (ref 7–12)
PMV BLD AUTO: 9.6 FL (ref 7–12)
PMV BLD AUTO: 9.8 FL (ref 7–12)
PMV BLD AUTO: 9.9 FL (ref 7–12)
PO2: 190.7 MMHG (ref 60–100)
POLYCHROMASIA: ABNORMAL
POLYCHROMASIA: ABNORMAL
POTASSIUM REFLEX MAGNESIUM: 3 MMOL/L (ref 3.5–5)
POTASSIUM REFLEX MAGNESIUM: 3.2 MMOL/L (ref 3.5–5)
POTASSIUM REFLEX MAGNESIUM: 3.4 MMOL/L (ref 3.5–5)
POTASSIUM REFLEX MAGNESIUM: 3.5 MMOL/L (ref 3.5–5)
POTASSIUM REFLEX MAGNESIUM: 3.6 MMOL/L (ref 3.5–5)
POTASSIUM REFLEX MAGNESIUM: 3.8 MMOL/L (ref 3.5–5)
POTASSIUM REFLEX MAGNESIUM: 4 MMOL/L (ref 3.5–5)
POTASSIUM SERPL-SCNC: 3.6 MMOL/L (ref 3.5–5)
POTASSIUM SERPL-SCNC: 3.9 MMOL/L (ref 3.5–5)
PRO-BNP: ABNORMAL PG/ML (ref 0–450)
PROCALCITONIN: 58.21 NG/ML (ref 0–0.08)
PROTEIN UA: >=300 MG/DL
PROTHROMBIN TIME: 12 SEC (ref 9.3–12.4)
PROTHROMBIN TIME: 12.2 SEC (ref 9.3–12.4)
PROTHROMBIN TIME: 12.4 SEC (ref 9.3–12.4)
PROTHROMBIN TIME: 12.9 SEC (ref 9.3–12.4)
PROTHROMBIN TIME: 14.1 SEC (ref 9.3–12.4)
PROTHROMBIN TIME: 14.2 SEC (ref 9.3–12.4)
PROTHROMBIN TIME: 14.5 SEC (ref 9.3–12.4)
PROTHROMBIN TIME: 14.7 SEC (ref 9.3–12.4)
PROTHROMBIN TIME: 14.8 SEC (ref 9.3–12.4)
PROTHROMBIN TIME: 14.9 SEC (ref 9.3–12.4)
RBC # BLD: 2.63 E12/L (ref 3.8–5.8)
RBC # BLD: 2.77 E12/L (ref 3.8–5.8)
RBC # BLD: 2.9 E12/L (ref 3.8–5.8)
RBC # BLD: 2.9 E12/L (ref 3.8–5.8)
RBC # BLD: 2.92 E12/L (ref 3.8–5.8)
RBC # BLD: 2.92 E12/L (ref 3.8–5.8)
RBC # BLD: 3 E12/L (ref 3.8–5.8)
RBC # BLD: 3.03 E12/L (ref 3.8–5.8)
RBC # BLD: 3.03 E12/L (ref 3.8–5.8)
RBC # BLD: 3.09 E12/L (ref 3.8–5.8)
RBC # BLD: 3.1 E12/L (ref 3.8–5.8)
RBC # BLD: 3.11 E12/L (ref 3.8–5.8)
RBC # BLD: 3.75 E12/L (ref 3.8–5.8)
RBC FLUID: <2000 /UL
RBC FLUID: <2000 /UL
RBC UA: ABNORMAL /HPF (ref 0–2)
REPORT: NORMAL
SALICYLATE, SERUM: <0.3 MG/DL (ref 0–30)
SMEAR, RESPIRATORY: ABNORMAL
SODIUM BLD-SCNC: 128 MMOL/L (ref 132–146)
SODIUM BLD-SCNC: 129 MMOL/L (ref 132–146)
SODIUM BLD-SCNC: 130 MMOL/L (ref 132–146)
SODIUM BLD-SCNC: 132 MMOL/L (ref 132–146)
SODIUM BLD-SCNC: 133 MMOL/L (ref 132–146)
SODIUM BLD-SCNC: 134 MMOL/L (ref 132–146)
SODIUM BLD-SCNC: 135 MMOL/L (ref 132–146)
SODIUM BLD-SCNC: 136 MMOL/L (ref 132–146)
SODIUM BLD-SCNC: 137 MMOL/L (ref 132–146)
SODIUM BLD-SCNC: 138 MMOL/L (ref 132–146)
SODIUM BLD-SCNC: 139 MMOL/L (ref 132–146)
SOURCE, BLOOD GAS: ABNORMAL
SPECIFIC GRAVITY UA: 1.02 (ref 1–1.03)
STREP PNEUMONIAE ANTIGEN, URINE: NORMAL
THB: 11.9 G/DL (ref 11.5–16.5)
TIME ANALYZED: 1108
TOTAL CK: 19 U/L (ref 20–200)
TOTAL CK: 33 U/L (ref 20–200)
TOTAL CK: 40 U/L (ref 20–200)
TOTAL CK: 56 U/L (ref 20–200)
TOTAL CK: 59 U/L (ref 20–200)
TOTAL IRON BINDING CAPACITY: 120 MCG/DL (ref 250–450)
TOTAL PROTEIN: 4.7 G/DL (ref 6.4–8.3)
TOTAL PROTEIN: 4.9 G/DL (ref 6.4–8.3)
TOTAL PROTEIN: 4.9 G/DL (ref 6.4–8.3)
TOTAL PROTEIN: 5 G/DL (ref 6.4–8.3)
TOTAL PROTEIN: 5 G/DL (ref 6.4–8.3)
TOTAL PROTEIN: 5.1 G/DL (ref 6.4–8.3)
TOTAL PROTEIN: 5.2 G/DL (ref 6.4–8.3)
TOTAL PROTEIN: 5.3 G/DL (ref 6.4–8.3)
TOTAL PROTEIN: 5.4 G/DL (ref 6.4–8.3)
TOXIC GRANULATION: ABNORMAL
TRICYCLIC ANTIDEPRESSANTS SCREEN SERUM: NEGATIVE NG/ML
TRIGL SERPL-MCNC: 81 MG/DL (ref 0–149)
TROPONIN: 0.07 NG/ML (ref 0–0.03)
TROPONIN: 0.08 NG/ML (ref 0–0.03)
TROPONIN: 0.1 NG/ML (ref 0–0.03)
TSH SERPL DL<=0.05 MIU/L-ACNC: 0.49 UIU/ML (ref 0.27–4.2)
URINE CULTURE, ROUTINE: NORMAL
UROBILINOGEN, URINE: 0.2 E.U./DL
VANCOMYCIN RANDOM: 12.4 MCG/ML (ref 5–40)
VLDLC SERPL CALC-MCNC: 16 MG/DL
WBC # BLD: 10.3 E9/L (ref 4.5–11.5)
WBC # BLD: 12.5 E9/L (ref 4.5–11.5)
WBC # BLD: 12.8 E9/L (ref 4.5–11.5)
WBC # BLD: 13.1 E9/L (ref 4.5–11.5)
WBC # BLD: 13.5 E9/L (ref 4.5–11.5)
WBC # BLD: 13.5 E9/L (ref 4.5–11.5)
WBC # BLD: 14.1 E9/L (ref 4.5–11.5)
WBC # BLD: 16.8 E9/L (ref 4.5–11.5)
WBC # BLD: 18.3 E9/L (ref 4.5–11.5)
WBC # BLD: 19.5 E9/L (ref 4.5–11.5)
WBC # BLD: 21.8 E9/L (ref 4.5–11.5)
WBC # BLD: 22.8 E9/L (ref 4.5–11.5)
WBC # BLD: 25.5 E9/L (ref 4.5–11.5)
WBC UA: ABNORMAL /HPF (ref 0–5)

## 2018-01-01 PROCEDURE — 6370000000 HC RX 637 (ALT 250 FOR IP): Performed by: INTERNAL MEDICINE

## 2018-01-01 PROCEDURE — 94640 AIRWAY INHALATION TREATMENT: CPT

## 2018-01-01 PROCEDURE — 2580000003 HC RX 258: Performed by: INTERNAL MEDICINE

## 2018-01-01 PROCEDURE — P9046 ALBUMIN (HUMAN), 25%, 20 ML: HCPCS | Performed by: INTERNAL MEDICINE

## 2018-01-01 PROCEDURE — 2060000000 HC ICU INTERMEDIATE R&B

## 2018-01-01 PROCEDURE — 89051 BODY FLUID CELL COUNT: CPT

## 2018-01-01 PROCEDURE — 6370000000 HC RX 637 (ALT 250 FOR IP): Performed by: STUDENT IN AN ORGANIZED HEALTH CARE EDUCATION/TRAINING PROGRAM

## 2018-01-01 PROCEDURE — 97530 THERAPEUTIC ACTIVITIES: CPT

## 2018-01-01 PROCEDURE — 90945 DIALYSIS ONE EVALUATION: CPT | Performed by: INTERNAL MEDICINE

## 2018-01-01 PROCEDURE — 97166 OT EVAL MOD COMPLEX 45 MIN: CPT

## 2018-01-01 PROCEDURE — 99223 1ST HOSP IP/OBS HIGH 75: CPT | Performed by: CLINICAL NURSE SPECIALIST

## 2018-01-01 PROCEDURE — 99232 SBSQ HOSP IP/OBS MODERATE 35: CPT | Performed by: INTERNAL MEDICINE

## 2018-01-01 PROCEDURE — 2700000000 HC OXYGEN THERAPY PER DAY

## 2018-01-01 PROCEDURE — 97535 SELF CARE MNGMENT TRAINING: CPT

## 2018-01-01 PROCEDURE — 87070 CULTURE OTHR SPECIMN AEROBIC: CPT

## 2018-01-01 PROCEDURE — 99223 1ST HOSP IP/OBS HIGH 75: CPT | Performed by: INTERNAL MEDICINE

## 2018-01-01 PROCEDURE — 82962 GLUCOSE BLOOD TEST: CPT

## 2018-01-01 PROCEDURE — 94669 MECHANICAL CHEST WALL OSCILL: CPT

## 2018-01-01 PROCEDURE — 96365 THER/PROPH/DIAG IV INF INIT: CPT

## 2018-01-01 PROCEDURE — 90945 DIALYSIS ONE EVALUATION: CPT

## 2018-01-01 PROCEDURE — G0480 DRUG TEST DEF 1-7 CLASSES: HCPCS

## 2018-01-01 PROCEDURE — 84484 ASSAY OF TROPONIN QUANT: CPT

## 2018-01-01 PROCEDURE — 2580000003 HC RX 258: Performed by: SPECIALIST

## 2018-01-01 PROCEDURE — 36415 COLL VENOUS BLD VENIPUNCTURE: CPT

## 2018-01-01 PROCEDURE — 94761 N-INVAS EAR/PLS OXIMETRY MLT: CPT

## 2018-01-01 PROCEDURE — 6360000002 HC RX W HCPCS: Performed by: STUDENT IN AN ORGANIZED HEALTH CARE EDUCATION/TRAINING PROGRAM

## 2018-01-01 PROCEDURE — 87077 CULTURE AEROBIC IDENTIFY: CPT

## 2018-01-01 PROCEDURE — 94760 N-INVAS EAR/PLS OXIMETRY 1: CPT

## 2018-01-01 PROCEDURE — 85025 COMPLETE CBC W/AUTO DIFF WBC: CPT

## 2018-01-01 PROCEDURE — 80053 COMPREHEN METABOLIC PANEL: CPT

## 2018-01-01 PROCEDURE — 6360000002 HC RX W HCPCS: Performed by: INTERNAL MEDICINE

## 2018-01-01 PROCEDURE — 3609011500 HC BRONCHOSCOPY DIAGNOSTIC OR CELL WASH ONLY: Performed by: INTERNAL MEDICINE

## 2018-01-01 PROCEDURE — B543ZZA ULTRASONOGRAPHY OF RIGHT JUGULAR VEINS, GUIDANCE: ICD-10-PCS | Performed by: INTERNAL MEDICINE

## 2018-01-01 PROCEDURE — 84100 ASSAY OF PHOSPHORUS: CPT

## 2018-01-01 PROCEDURE — 05HM33Z INSERTION OF INFUSION DEVICE INTO RIGHT INTERNAL JUGULAR VEIN, PERCUTANEOUS APPROACH: ICD-10-PCS | Performed by: INTERNAL MEDICINE

## 2018-01-01 PROCEDURE — 2500000003 HC RX 250 WO HCPCS: Performed by: INTERNAL MEDICINE

## 2018-01-01 PROCEDURE — 71250 CT THORAX DX C-: CPT

## 2018-01-01 PROCEDURE — 2580000003 HC RX 258: Performed by: EMERGENCY MEDICINE

## 2018-01-01 PROCEDURE — 80202 ASSAY OF VANCOMYCIN: CPT

## 2018-01-01 PROCEDURE — 2580000003 HC RX 258: Performed by: STUDENT IN AN ORGANIZED HEALTH CARE EDUCATION/TRAINING PROGRAM

## 2018-01-01 PROCEDURE — 6360000002 HC RX W HCPCS: Performed by: SPECIALIST

## 2018-01-01 PROCEDURE — 3609010800 HC BRONCHOSCOPY ALVEOLAR LAVAGE: Performed by: INTERNAL MEDICINE

## 2018-01-01 PROCEDURE — 6360000002 HC RX W HCPCS: Performed by: PHYSICIAN ASSISTANT

## 2018-01-01 PROCEDURE — G8427 DOCREV CUR MEDS BY ELIG CLIN: HCPCS | Performed by: INTERNAL MEDICINE

## 2018-01-01 PROCEDURE — 94003 VENT MGMT INPAT SUBQ DAY: CPT

## 2018-01-01 PROCEDURE — 71045 X-RAY EXAM CHEST 1 VIEW: CPT

## 2018-01-01 PROCEDURE — 1123F ACP DISCUSS/DSCN MKR DOCD: CPT | Performed by: INTERNAL MEDICINE

## 2018-01-01 PROCEDURE — 94660 CPAP INITIATION&MGMT: CPT

## 2018-01-01 PROCEDURE — 36592 COLLECT BLOOD FROM PICC: CPT

## 2018-01-01 PROCEDURE — 4040F PNEUMOC VAC/ADMIN/RCVD: CPT | Performed by: INTERNAL MEDICINE

## 2018-01-01 PROCEDURE — 3700000001 HC ADD 15 MINUTES (ANESTHESIA): Performed by: INTERNAL MEDICINE

## 2018-01-01 PROCEDURE — 99233 SBSQ HOSP IP/OBS HIGH 50: CPT | Performed by: INTERNAL MEDICINE

## 2018-01-01 PROCEDURE — 2000000000 HC ICU R&B

## 2018-01-01 PROCEDURE — 97112 NEUROMUSCULAR REEDUCATION: CPT

## 2018-01-01 PROCEDURE — 7100000000 HC PACU RECOVERY - FIRST 15 MIN: Performed by: INTERNAL MEDICINE

## 2018-01-01 PROCEDURE — 93005 ELECTROCARDIOGRAM TRACING: CPT | Performed by: INTERNAL MEDICINE

## 2018-01-01 PROCEDURE — 85610 PROTHROMBIN TIME: CPT

## 2018-01-01 PROCEDURE — 82553 CREATINE MB FRACTION: CPT

## 2018-01-01 PROCEDURE — 36569 INSJ PICC 5 YR+ W/O IMAGING: CPT

## 2018-01-01 PROCEDURE — 82805 BLOOD GASES W/O2 SATURATION: CPT

## 2018-01-01 PROCEDURE — 83036 HEMOGLOBIN GLYCOSYLATED A1C: CPT

## 2018-01-01 PROCEDURE — G8484 FLU IMMUNIZE NO ADMIN: HCPCS | Performed by: INTERNAL MEDICINE

## 2018-01-01 PROCEDURE — 83735 ASSAY OF MAGNESIUM: CPT

## 2018-01-01 PROCEDURE — 36556 INSERT NON-TUNNEL CV CATH: CPT | Performed by: INTERNAL MEDICINE

## 2018-01-01 PROCEDURE — 93005 ELECTROCARDIOGRAM TRACING: CPT | Performed by: EMERGENCY MEDICINE

## 2018-01-01 PROCEDURE — 80074 ACUTE HEPATITIS PANEL: CPT

## 2018-01-01 PROCEDURE — 87186 SC STD MICRODIL/AGAR DIL: CPT

## 2018-01-01 PROCEDURE — 83540 ASSAY OF IRON: CPT

## 2018-01-01 PROCEDURE — 99214 OFFICE O/P EST MOD 30 MIN: CPT | Performed by: INTERNAL MEDICINE

## 2018-01-01 PROCEDURE — 1036F TOBACCO NON-USER: CPT | Performed by: INTERNAL MEDICINE

## 2018-01-01 PROCEDURE — 80061 LIPID PANEL: CPT

## 2018-01-01 PROCEDURE — 82550 ASSAY OF CK (CPK): CPT

## 2018-01-01 PROCEDURE — 87116 MYCOBACTERIA CULTURE: CPT

## 2018-01-01 PROCEDURE — G8417 CALC BMI ABV UP PARAM F/U: HCPCS | Performed by: INTERNAL MEDICINE

## 2018-01-01 PROCEDURE — 2580000003 HC RX 258: Performed by: PHYSICIAN ASSISTANT

## 2018-01-01 PROCEDURE — 84145 PROCALCITONIN (PCT): CPT

## 2018-01-01 PROCEDURE — 90935 HEMODIALYSIS ONE EVALUATION: CPT | Performed by: INTERNAL MEDICINE

## 2018-01-01 PROCEDURE — 88112 CYTOPATH CELL ENHANCE TECH: CPT

## 2018-01-01 PROCEDURE — 84443 ASSAY THYROID STIM HORMONE: CPT

## 2018-01-01 PROCEDURE — 6360000002 HC RX W HCPCS: Performed by: EMERGENCY MEDICINE

## 2018-01-01 PROCEDURE — 85730 THROMBOPLASTIN TIME PARTIAL: CPT

## 2018-01-01 PROCEDURE — 94002 VENT MGMT INPAT INIT DAY: CPT

## 2018-01-01 PROCEDURE — 6370000000 HC RX 637 (ALT 250 FOR IP): Performed by: SPECIALIST

## 2018-01-01 PROCEDURE — 97110 THERAPEUTIC EXERCISES: CPT

## 2018-01-01 PROCEDURE — 96372 THER/PROPH/DIAG INJ SC/IM: CPT

## 2018-01-01 PROCEDURE — 93306 TTE W/DOPPLER COMPLETE: CPT

## 2018-01-01 PROCEDURE — 99285 EMERGENCY DEPT VISIT HI MDM: CPT

## 2018-01-01 PROCEDURE — 82533 TOTAL CORTISOL: CPT

## 2018-01-01 PROCEDURE — 81001 URINALYSIS AUTO W/SCOPE: CPT

## 2018-01-01 PROCEDURE — 83690 ASSAY OF LIPASE: CPT

## 2018-01-01 PROCEDURE — 1101F PT FALLS ASSESS-DOCD LE1/YR: CPT | Performed by: INTERNAL MEDICINE

## 2018-01-01 PROCEDURE — G8978 MOBILITY CURRENT STATUS: HCPCS

## 2018-01-01 PROCEDURE — 83550 IRON BINDING TEST: CPT

## 2018-01-01 PROCEDURE — 87088 URINE BACTERIA CULTURE: CPT

## 2018-01-01 PROCEDURE — 76705 ECHO EXAM OF ABDOMEN: CPT

## 2018-01-01 PROCEDURE — 31624 DX BRONCHOSCOPE/LAVAGE: CPT | Performed by: INTERNAL MEDICINE

## 2018-01-01 PROCEDURE — 86706 HEP B SURFACE ANTIBODY: CPT

## 2018-01-01 PROCEDURE — 83605 ASSAY OF LACTIC ACID: CPT

## 2018-01-01 PROCEDURE — 99213 OFFICE O/P EST LOW 20 MIN: CPT | Performed by: INTERNAL MEDICINE

## 2018-01-01 PROCEDURE — 74176 CT ABD & PELVIS W/O CONTRAST: CPT

## 2018-01-01 PROCEDURE — 82728 ASSAY OF FERRITIN: CPT

## 2018-01-01 PROCEDURE — 0B9F8ZX DRAINAGE OF RIGHT LOWER LUNG LOBE, VIA NATURAL OR ARTIFICIAL OPENING ENDOSCOPIC, DIAGNOSTIC: ICD-10-PCS | Performed by: INTERNAL MEDICINE

## 2018-01-01 PROCEDURE — G8988 SELF CARE GOAL STATUS: HCPCS

## 2018-01-01 PROCEDURE — 2500000003 HC RX 250 WO HCPCS: Performed by: STUDENT IN AN ORGANIZED HEALTH CARE EDUCATION/TRAINING PROGRAM

## 2018-01-01 PROCEDURE — 80048 BASIC METABOLIC PNL TOTAL CA: CPT

## 2018-01-01 PROCEDURE — 87798 DETECT AGENT NOS DNA AMP: CPT

## 2018-01-01 PROCEDURE — 87581 M.PNEUMON DNA AMP PROBE: CPT

## 2018-01-01 PROCEDURE — 86738 MYCOPLASMA ANTIBODY: CPT

## 2018-01-01 PROCEDURE — 87015 SPECIMEN INFECT AGNT CONCNTJ: CPT

## 2018-01-01 PROCEDURE — 2580000003 HC RX 258

## 2018-01-01 PROCEDURE — 94667 MNPJ CHEST WALL 1ST: CPT

## 2018-01-01 PROCEDURE — 99232 SBSQ HOSP IP/OBS MODERATE 35: CPT | Performed by: PHYSICIAN ASSISTANT

## 2018-01-01 PROCEDURE — 3E1M39Z IRRIGATION OF PERITONEAL CAVITY USING DIALYSATE, PERCUTANEOUS APPROACH: ICD-10-PCS | Performed by: INTERNAL MEDICINE

## 2018-01-01 PROCEDURE — G8979 MOBILITY GOAL STATUS: HCPCS

## 2018-01-01 PROCEDURE — APPSS60 APP SPLIT SHARED TIME 46-60 MINUTES: Performed by: NURSE PRACTITIONER

## 2018-01-01 PROCEDURE — 83880 ASSAY OF NATRIURETIC PEPTIDE: CPT

## 2018-01-01 PROCEDURE — 87206 SMEAR FLUORESCENT/ACID STAI: CPT

## 2018-01-01 PROCEDURE — 93000 ELECTROCARDIOGRAM COMPLETE: CPT | Performed by: INTERNAL MEDICINE

## 2018-01-01 PROCEDURE — 87205 SMEAR GRAM STAIN: CPT

## 2018-01-01 PROCEDURE — 97162 PT EVAL MOD COMPLEX 30 MIN: CPT

## 2018-01-01 PROCEDURE — 7100000001 HC PACU RECOVERY - ADDTL 15 MIN: Performed by: INTERNAL MEDICINE

## 2018-01-01 PROCEDURE — 93010 ELECTROCARDIOGRAM REPORT: CPT | Performed by: INTERNAL MEDICINE

## 2018-01-01 PROCEDURE — 87486 CHLMYD PNEUM DNA AMP PROBE: CPT

## 2018-01-01 PROCEDURE — 86481 TB AG RESPONSE T-CELL SUSP: CPT

## 2018-01-01 PROCEDURE — 87633 RESP VIRUS 12-25 TARGETS: CPT

## 2018-01-01 PROCEDURE — 6370000000 HC RX 637 (ALT 250 FOR IP): Performed by: EMERGENCY MEDICINE

## 2018-01-01 PROCEDURE — 87040 BLOOD CULTURE FOR BACTERIA: CPT

## 2018-01-01 PROCEDURE — 36556 INSERT NON-TUNNEL CV CATH: CPT

## 2018-01-01 PROCEDURE — 87450 HC DIRECT STREP B ANTIGEN: CPT

## 2018-01-01 PROCEDURE — 3700000000 HC ANESTHESIA ATTENDED CARE: Performed by: INTERNAL MEDICINE

## 2018-01-01 PROCEDURE — 6370000000 HC RX 637 (ALT 250 FOR IP)

## 2018-01-01 PROCEDURE — 87102 FUNGUS ISOLATION CULTURE: CPT

## 2018-01-01 PROCEDURE — C1751 CATH, INF, PER/CENT/MIDLINE: HCPCS

## 2018-01-01 PROCEDURE — 94664 DEMO&/EVAL PT USE INHALER: CPT

## 2018-01-01 PROCEDURE — 80307 DRUG TEST PRSMV CHEM ANLYZR: CPT

## 2018-01-01 PROCEDURE — G8987 SELF CARE CURRENT STATUS: HCPCS

## 2018-01-01 RX ORDER — ALBUMIN (HUMAN) 12.5 G/50ML
25 SOLUTION INTRAVENOUS ONCE
Status: CANCELLED
Start: 2018-01-01 | End: 2018-01-01

## 2018-01-01 RX ORDER — TRIAMCINOLONE ACETONIDE 1 MG/G
CREAM TOPICAL 2 TIMES DAILY
COMMUNITY

## 2018-01-01 RX ORDER — DIGOXIN 0.25 MG/ML
250 INJECTION INTRAMUSCULAR; INTRAVENOUS EVERY 6 HOURS
Status: COMPLETED | OUTPATIENT
Start: 2018-01-01 | End: 2018-01-01

## 2018-01-01 RX ORDER — ALBUMIN (HUMAN) 12.5 G/50ML
50 SOLUTION INTRAVENOUS ONCE
Status: CANCELLED
Start: 2018-01-01 | End: 2018-01-01

## 2018-01-01 RX ORDER — SODIUM CHLORIDE 0.9 % (FLUSH) 0.9 %
10 SYRINGE (ML) INJECTION PRN
Status: CANCELLED | OUTPATIENT
Start: 2018-01-01

## 2018-01-01 RX ORDER — FLUCONAZOLE, SODIUM CHLORIDE 2 MG/ML
100 INJECTION INTRAVENOUS EVERY 24 HOURS
Status: DISCONTINUED | OUTPATIENT
Start: 2018-01-01 | End: 2018-01-01 | Stop reason: HOSPADM

## 2018-01-01 RX ORDER — IPRATROPIUM BROMIDE AND ALBUTEROL SULFATE 2.5; .5 MG/3ML; MG/3ML
1 SOLUTION RESPIRATORY (INHALATION) ONCE
Status: COMPLETED | OUTPATIENT
Start: 2018-01-01 | End: 2018-01-01

## 2018-01-01 RX ORDER — ERGOCALCIFEROL 1.25 MG/1
50000 CAPSULE ORAL WEEKLY
COMMUNITY

## 2018-01-01 RX ORDER — SODIUM CHLORIDE 0.9 % (FLUSH) 0.9 %
10 SYRINGE (ML) INJECTION PRN
Status: DISCONTINUED | OUTPATIENT
Start: 2018-01-01 | End: 2018-01-01 | Stop reason: HOSPADM

## 2018-01-01 RX ORDER — BUMETANIDE 2 MG/1
2 TABLET ORAL 2 TIMES DAILY
Qty: 30 TABLET | Refills: 3 | Status: SHIPPED | OUTPATIENT
Start: 2018-01-01

## 2018-01-01 RX ORDER — PROPOFOL 10 MG/ML
INJECTION, EMULSION INTRAVENOUS PRN
Status: DISCONTINUED | OUTPATIENT
Start: 2018-01-01 | End: 2018-01-01 | Stop reason: SDUPTHER

## 2018-01-01 RX ORDER — SODIUM CHLORIDE 0.9 % (FLUSH) 0.9 %
10 SYRINGE (ML) INJECTION PRN
Status: CANCELLED
Start: 2018-01-01

## 2018-01-01 RX ORDER — SODIUM CHLORIDE 9 MG/ML
INJECTION, SOLUTION INTRAVENOUS CONTINUOUS
Status: CANCELLED
Start: 2018-01-01

## 2018-01-01 RX ORDER — ALBUMIN (HUMAN) 12.5 G/50ML
50 SOLUTION INTRAVENOUS ONCE
Status: COMPLETED | OUTPATIENT
Start: 2018-01-01 | End: 2018-01-01

## 2018-01-01 RX ORDER — BUMETANIDE 0.25 MG/ML
2 INJECTION, SOLUTION INTRAMUSCULAR; INTRAVENOUS 2 TIMES DAILY
Status: DISCONTINUED | OUTPATIENT
Start: 2018-01-01 | End: 2018-01-01

## 2018-01-01 RX ORDER — FORMOTEROL FUMARATE 20 UG/2ML
20 SOLUTION RESPIRATORY (INHALATION) 2 TIMES DAILY
Status: DISCONTINUED | OUTPATIENT
Start: 2018-01-01 | End: 2018-01-01 | Stop reason: HOSPADM

## 2018-01-01 RX ORDER — ALBUMIN (HUMAN) 12.5 G/50ML
25 SOLUTION INTRAVENOUS ONCE
Status: COMPLETED | OUTPATIENT
Start: 2018-01-01 | End: 2018-01-01

## 2018-01-01 RX ORDER — POTASSIUM CHLORIDE 29.8 MG/ML
20 INJECTION INTRAVENOUS ONCE
Status: COMPLETED | OUTPATIENT
Start: 2018-01-01 | End: 2018-01-01

## 2018-01-01 RX ORDER — WARFARIN SODIUM 1 MG/1
1 TABLET ORAL DAILY
Qty: 10 TABLET | Refills: 0 | Status: SHIPPED | OUTPATIENT
Start: 2018-01-01

## 2018-01-01 RX ORDER — POTASSIUM CHLORIDE 29.8 MG/ML
20 INJECTION INTRAVENOUS
Status: COMPLETED | OUTPATIENT
Start: 2018-01-01 | End: 2018-01-01

## 2018-01-01 RX ORDER — ACETAMINOPHEN 325 MG/1
650 TABLET ORAL EVERY 4 HOURS PRN
Status: DISCONTINUED | OUTPATIENT
Start: 2018-01-01 | End: 2018-01-01 | Stop reason: HOSPADM

## 2018-01-01 RX ORDER — HEPARIN SODIUM 10000 [USP'U]/ML
5000 INJECTION, SOLUTION INTRAVENOUS; SUBCUTANEOUS 3 TIMES DAILY
Status: DISCONTINUED | OUTPATIENT
Start: 2018-01-01 | End: 2018-01-01

## 2018-01-01 RX ORDER — BUDESONIDE 0.5 MG/2ML
500 INHALANT ORAL 2 TIMES DAILY
Status: DISCONTINUED | OUTPATIENT
Start: 2018-01-01 | End: 2018-01-01 | Stop reason: HOSPADM

## 2018-01-01 RX ORDER — IPRATROPIUM BROMIDE AND ALBUTEROL SULFATE 2.5; .5 MG/3ML; MG/3ML
1 SOLUTION RESPIRATORY (INHALATION)
Status: DISCONTINUED | OUTPATIENT
Start: 2018-01-01 | End: 2018-01-01

## 2018-01-01 RX ORDER — 0.9 % SODIUM CHLORIDE 0.9 %
1000 INTRAVENOUS SOLUTION INTRAVENOUS ONCE
Status: COMPLETED | OUTPATIENT
Start: 2018-01-01 | End: 2018-01-01

## 2018-01-01 RX ORDER — DEXTROSE MONOHYDRATE 25 G/50ML
12.5 INJECTION, SOLUTION INTRAVENOUS PRN
Status: DISCONTINUED | OUTPATIENT
Start: 2018-01-01 | End: 2018-01-01 | Stop reason: HOSPADM

## 2018-01-01 RX ORDER — METOPROLOL TARTRATE 5 MG/5ML
2.5 INJECTION INTRAVENOUS EVERY 4 HOURS PRN
Status: DISCONTINUED | OUTPATIENT
Start: 2018-01-01 | End: 2018-01-01

## 2018-01-01 RX ORDER — POTASSIUM CHLORIDE 20MEQ/15ML
20 LIQUID (ML) ORAL ONCE
Status: COMPLETED | OUTPATIENT
Start: 2018-01-01 | End: 2018-01-01

## 2018-01-01 RX ORDER — DEXTROSE MONOHYDRATE 50 MG/ML
100 INJECTION, SOLUTION INTRAVENOUS PRN
Status: DISCONTINUED | OUTPATIENT
Start: 2018-01-01 | End: 2018-01-01 | Stop reason: HOSPADM

## 2018-01-01 RX ORDER — LEVALBUTEROL INHALATION SOLUTION 0.63 MG/3ML
0.63 SOLUTION RESPIRATORY (INHALATION)
Status: DISCONTINUED | OUTPATIENT
Start: 2018-01-01 | End: 2018-01-01 | Stop reason: HOSPADM

## 2018-01-01 RX ORDER — DIGOXIN 125 MCG
62.5 TABLET ORAL EVERY OTHER DAY
Status: DISCONTINUED | OUTPATIENT
Start: 2018-01-01 | End: 2018-01-01

## 2018-01-01 RX ORDER — SODIUM CHLORIDE 0.9 % (FLUSH) 0.9 %
5 SYRINGE (ML) INJECTION PRN
Status: CANCELLED | OUTPATIENT
Start: 2018-01-01

## 2018-01-01 RX ORDER — METOLAZONE 2.5 MG/1
2.5 TABLET ORAL DAILY
COMMUNITY

## 2018-01-01 RX ORDER — NICOTINE POLACRILEX 4 MG
15 LOZENGE BUCCAL PRN
Status: DISCONTINUED | OUTPATIENT
Start: 2018-01-01 | End: 2018-01-01 | Stop reason: HOSPADM

## 2018-01-01 RX ORDER — POTASSIUM CHLORIDE 20 MEQ/1
40 TABLET, EXTENDED RELEASE ORAL ONCE
Status: COMPLETED | OUTPATIENT
Start: 2018-01-01 | End: 2018-01-01

## 2018-01-01 RX ORDER — SEVELAMER CARBONATE 800 MG/1
800 TABLET, FILM COATED ORAL
Qty: 90 TABLET | Refills: 3 | Status: SHIPPED | OUTPATIENT
Start: 2018-01-01

## 2018-01-01 RX ORDER — SEVELAMER CARBONATE 800 MG/1
800 TABLET, FILM COATED ORAL
Status: DISCONTINUED | OUTPATIENT
Start: 2018-01-01 | End: 2018-01-01 | Stop reason: HOSPADM

## 2018-01-01 RX ORDER — SODIUM CHLORIDE 0.9 % (FLUSH) 0.9 %
SYRINGE (ML) INJECTION
Status: COMPLETED
Start: 2018-01-01 | End: 2018-01-01

## 2018-01-01 RX ORDER — DILTIAZEM HYDROCHLORIDE 5 MG/ML
10 INJECTION INTRAVENOUS ONCE
Status: COMPLETED | OUTPATIENT
Start: 2018-01-01 | End: 2018-01-01

## 2018-01-01 RX ORDER — POTASSIUM CHLORIDE 20 MEQ/1
20 TABLET, EXTENDED RELEASE ORAL DAILY
Status: DISCONTINUED | OUTPATIENT
Start: 2018-01-01 | End: 2018-01-01

## 2018-01-01 RX ORDER — FLUCONAZOLE, SODIUM CHLORIDE 2 MG/ML
100 INJECTION INTRAVENOUS EVERY 24 HOURS
Qty: 1400 ML | Refills: 2 | Status: SHIPPED | OUTPATIENT
Start: 2018-01-01 | End: 2018-01-01

## 2018-01-01 RX ORDER — PANTOPRAZOLE SODIUM 40 MG/1
40 TABLET, DELAYED RELEASE ORAL
Qty: 30 TABLET | Refills: 3 | Status: SHIPPED | OUTPATIENT
Start: 2018-01-01

## 2018-01-01 RX ORDER — ONDANSETRON 2 MG/ML
4 INJECTION INTRAMUSCULAR; INTRAVENOUS EVERY 6 HOURS PRN
Status: DISCONTINUED | OUTPATIENT
Start: 2018-01-01 | End: 2018-01-01

## 2018-01-01 RX ORDER — PREDNISONE 10 MG/1
10 TABLET ORAL 2 TIMES DAILY
COMMUNITY
End: 2018-01-01 | Stop reason: ALTCHOICE

## 2018-01-01 RX ORDER — TRIAMCINOLONE ACETONIDE 1 MG/G
CREAM TOPICAL 2 TIMES DAILY
COMMUNITY
End: 2018-01-01 | Stop reason: ALTCHOICE

## 2018-01-01 RX ORDER — WARFARIN SODIUM 1 MG/1
1 TABLET ORAL
Status: COMPLETED | OUTPATIENT
Start: 2018-01-01 | End: 2018-01-01

## 2018-01-01 RX ORDER — MAGNESIUM SULFATE 1 G/100ML
1 INJECTION INTRAVENOUS ONCE
Status: COMPLETED | OUTPATIENT
Start: 2018-01-01 | End: 2018-01-01

## 2018-01-01 RX ORDER — PANTOPRAZOLE SODIUM 40 MG/1
40 TABLET, DELAYED RELEASE ORAL
Status: DISCONTINUED | OUTPATIENT
Start: 2018-01-01 | End: 2018-01-01 | Stop reason: HOSPADM

## 2018-01-01 RX ORDER — ACETYLCYSTEINE 200 MG/ML
600 SOLUTION ORAL; RESPIRATORY (INHALATION) 2 TIMES DAILY
Status: DISCONTINUED | OUTPATIENT
Start: 2018-01-01 | End: 2018-01-01

## 2018-01-01 RX ORDER — BUMETANIDE 1 MG/1
2 TABLET ORAL 2 TIMES DAILY
Status: DISCONTINUED | OUTPATIENT
Start: 2018-01-01 | End: 2018-01-01 | Stop reason: HOSPADM

## 2018-01-01 RX ORDER — ALBUMIN (HUMAN) 12.5 G/50ML
25 SOLUTION INTRAVENOUS EVERY 8 HOURS
Status: COMPLETED | OUTPATIENT
Start: 2018-01-01 | End: 2018-01-01

## 2018-01-01 RX ORDER — CETIRIZINE HYDROCHLORIDE 10 MG/1
10 TABLET ORAL DAILY
COMMUNITY

## 2018-01-01 RX ORDER — LIDOCAINE HYDROCHLORIDE 10 MG/ML
INJECTION, SOLUTION EPIDURAL; INFILTRATION; INTRACAUDAL; PERINEURAL PRN
Status: DISCONTINUED | OUTPATIENT
Start: 2018-01-01 | End: 2018-01-01 | Stop reason: HOSPADM

## 2018-01-01 RX ORDER — LISINOPRIL 5 MG/1
5 TABLET ORAL DAILY
COMMUNITY
End: 2018-01-01 | Stop reason: ALTCHOICE

## 2018-01-01 RX ORDER — SODIUM CHLORIDE 0.9 % (FLUSH) 0.9 %
10 SYRINGE (ML) INJECTION PRN
Status: DISCONTINUED | OUTPATIENT
Start: 2018-01-01 | End: 2018-01-01 | Stop reason: ALTCHOICE

## 2018-01-01 RX ORDER — POTASSIUM CHLORIDE 20MEQ/15ML
40 LIQUID (ML) ORAL ONCE
Status: DISCONTINUED | OUTPATIENT
Start: 2018-01-01 | End: 2018-01-01

## 2018-01-01 RX ORDER — WARFARIN SODIUM 2.5 MG/1
2.5 TABLET ORAL
Status: COMPLETED | OUTPATIENT
Start: 2018-01-01 | End: 2018-01-01

## 2018-01-01 RX ORDER — METOPROLOL TARTRATE 5 MG/5ML
2.5 INJECTION INTRAVENOUS EVERY 5 MIN PRN
Status: DISCONTINUED | OUTPATIENT
Start: 2018-01-01 | End: 2018-01-01

## 2018-01-01 RX ORDER — SODIUM CHLORIDE 9 MG/ML
INJECTION, SOLUTION INTRAVENOUS CONTINUOUS PRN
Status: DISCONTINUED | OUTPATIENT
Start: 2018-01-01 | End: 2018-01-01 | Stop reason: SDUPTHER

## 2018-01-01 RX ORDER — BUMETANIDE 2 MG/1
2 TABLET ORAL DAILY
COMMUNITY
End: 2018-01-01 | Stop reason: ALTCHOICE

## 2018-01-01 RX ORDER — SODIUM CHLORIDE 0.9 % (FLUSH) 0.9 %
10 SYRINGE (ML) INJECTION EVERY 12 HOURS SCHEDULED
Status: DISCONTINUED | OUTPATIENT
Start: 2018-01-01 | End: 2018-01-01 | Stop reason: HOSPADM

## 2018-01-01 RX ORDER — DOXYCYCLINE HYCLATE 100 MG/1
100 CAPSULE ORAL 2 TIMES DAILY
COMMUNITY
End: 2018-01-01 | Stop reason: ALTCHOICE

## 2018-01-01 RX ORDER — METOLAZONE 2.5 MG/1
2.5 TABLET ORAL DAILY
Status: DISCONTINUED | OUTPATIENT
Start: 2018-01-01 | End: 2018-01-01

## 2018-01-01 RX ORDER — SODIUM CHLORIDE FOR INHALATION 3 %
4 VIAL, NEBULIZER (ML) INHALATION PRN
Status: DISCONTINUED | OUTPATIENT
Start: 2018-01-01 | End: 2018-01-01 | Stop reason: HOSPADM

## 2018-01-01 RX ORDER — PREDNISONE 1 MG/1
5 TABLET ORAL 2 TIMES DAILY
COMMUNITY

## 2018-01-01 RX ORDER — AMMONIUM LACTATE 12 G/100G
LOTION TOPICAL PRN
COMMUNITY

## 2018-01-01 RX ADMIN — Medication 10 ML: at 13:57

## 2018-01-01 RX ADMIN — ALBUMIN (HUMAN) 25 G: 0.25 INJECTION, SOLUTION INTRAVENOUS at 04:38

## 2018-01-01 RX ADMIN — INSULIN LISPRO 1 UNITS: 100 INJECTION, SOLUTION INTRAVENOUS; SUBCUTANEOUS at 21:04

## 2018-01-01 RX ADMIN — ERTAPENEM SODIUM 500 MG: 1 INJECTION, POWDER, LYOPHILIZED, FOR SOLUTION INTRAMUSCULAR; INTRAVENOUS at 11:51

## 2018-01-01 RX ADMIN — BUMETANIDE 2 MG: 0.25 INJECTION INTRAMUSCULAR; INTRAVENOUS at 08:28

## 2018-01-01 RX ADMIN — PROPOFOL 60 MG: 10 INJECTION, EMULSION INTRAVENOUS at 15:00

## 2018-01-01 RX ADMIN — PANTOPRAZOLE SODIUM 40 MG: 40 TABLET, DELAYED RELEASE ORAL at 06:33

## 2018-01-01 RX ADMIN — Medication 10 ML: at 12:55

## 2018-01-01 RX ADMIN — ALBUMIN (HUMAN) 50 G: 0.25 INJECTION, SOLUTION INTRAVENOUS at 12:42

## 2018-01-01 RX ADMIN — IPRATROPIUM BROMIDE AND ALBUTEROL SULFATE 1 AMPULE: .5; 3 SOLUTION RESPIRATORY (INHALATION) at 20:11

## 2018-01-01 RX ADMIN — SEVELAMER CARBONATE 800 MG: 800 TABLET, FILM COATED ORAL at 08:56

## 2018-01-01 RX ADMIN — BUDESONIDE 500 MCG: 0.5 INHALANT RESPIRATORY (INHALATION) at 08:39

## 2018-01-01 RX ADMIN — FORMOTEROL FUMARATE DIHYDRATE 20 MCG: 20 SOLUTION RESPIRATORY (INHALATION) at 08:39

## 2018-01-01 RX ADMIN — DILTIAZEM HYDROCHLORIDE 60 MG: 30 TABLET, FILM COATED ORAL at 06:52

## 2018-01-01 RX ADMIN — PANTOPRAZOLE SODIUM 40 MG: 40 TABLET, DELAYED RELEASE ORAL at 05:14

## 2018-01-01 RX ADMIN — INSULIN LISPRO 3 UNITS: 100 INJECTION, SOLUTION INTRAVENOUS; SUBCUTANEOUS at 19:10

## 2018-01-01 RX ADMIN — Medication 10 ML: at 20:02

## 2018-01-01 RX ADMIN — Medication 10 ML: at 21:04

## 2018-01-01 RX ADMIN — BUMETANIDE 2 MG: 0.25 INJECTION INTRAMUSCULAR; INTRAVENOUS at 09:46

## 2018-01-01 RX ADMIN — IPRATROPIUM BROMIDE AND ALBUTEROL SULFATE 1 AMPULE: .5; 3 SOLUTION RESPIRATORY (INHALATION) at 13:00

## 2018-01-01 RX ADMIN — Medication 10 ML: at 13:06

## 2018-01-01 RX ADMIN — Medication 10 ML: at 09:25

## 2018-01-01 RX ADMIN — SEVELAMER CARBONATE 800 MG: 800 TABLET, FILM COATED ORAL at 18:30

## 2018-01-01 RX ADMIN — Medication 10 ML: at 12:25

## 2018-01-01 RX ADMIN — PANTOPRAZOLE SODIUM 40 MG: 40 TABLET, DELAYED RELEASE ORAL at 06:20

## 2018-01-01 RX ADMIN — DILTIAZEM HYDROCHLORIDE 60 MG: 30 TABLET, FILM COATED ORAL at 21:56

## 2018-01-01 RX ADMIN — LEVALBUTEROL 0.63 MG: 0.63 SOLUTION RESPIRATORY (INHALATION) at 12:18

## 2018-01-01 RX ADMIN — HEPARIN SODIUM 5000 UNITS: 10000 INJECTION INTRAVENOUS; SUBCUTANEOUS at 21:03

## 2018-01-01 RX ADMIN — ALBUMIN (HUMAN) 25 G: 0.25 INJECTION, SOLUTION INTRAVENOUS at 21:18

## 2018-01-01 RX ADMIN — INSULIN LISPRO 1 UNITS: 100 INJECTION, SOLUTION INTRAVENOUS; SUBCUTANEOUS at 12:44

## 2018-01-01 RX ADMIN — ALBUMIN (HUMAN) 50 G: 0.25 INJECTION, SOLUTION INTRAVENOUS at 12:35

## 2018-01-01 RX ADMIN — IPRATROPIUM BROMIDE AND ALBUTEROL SULFATE 1 AMPULE: .5; 3 SOLUTION RESPIRATORY (INHALATION) at 15:59

## 2018-01-01 RX ADMIN — LEVALBUTEROL 0.63 MG: 0.63 SOLUTION RESPIRATORY (INHALATION) at 11:06

## 2018-01-01 RX ADMIN — IPRATROPIUM BROMIDE AND ALBUTEROL SULFATE 1 AMPULE: .5; 3 SOLUTION RESPIRATORY (INHALATION) at 16:19

## 2018-01-01 RX ADMIN — DILTIAZEM HYDROCHLORIDE 60 MG: 30 TABLET, FILM COATED ORAL at 04:33

## 2018-01-01 RX ADMIN — LEVALBUTEROL 0.63 MG: 0.63 SOLUTION RESPIRATORY (INHALATION) at 11:24

## 2018-01-01 RX ADMIN — BUMETANIDE 2 MG: 0.25 INJECTION INTRAMUSCULAR; INTRAVENOUS at 09:24

## 2018-01-01 RX ADMIN — ERTAPENEM SODIUM 500 MG: 1 INJECTION, POWDER, LYOPHILIZED, FOR SOLUTION INTRAMUSCULAR; INTRAVENOUS at 11:52

## 2018-01-01 RX ADMIN — FORMOTEROL FUMARATE DIHYDRATE 20 MCG: 20 SOLUTION RESPIRATORY (INHALATION) at 19:50

## 2018-01-01 RX ADMIN — NYSTATIN 500000 UNITS: 500000 SUSPENSION ORAL at 09:24

## 2018-01-01 RX ADMIN — INSULIN LISPRO 3 UNITS: 100 INJECTION, SOLUTION INTRAVENOUS; SUBCUTANEOUS at 21:09

## 2018-01-01 RX ADMIN — DILTIAZEM HYDROCHLORIDE 60 MG: 30 TABLET, FILM COATED ORAL at 17:35

## 2018-01-01 RX ADMIN — FORMOTEROL FUMARATE DIHYDRATE 20 MCG: 20 SOLUTION RESPIRATORY (INHALATION) at 20:10

## 2018-01-01 RX ADMIN — SEVELAMER CARBONATE 800 MG: 800 TABLET, FILM COATED ORAL at 13:00

## 2018-01-01 RX ADMIN — LEVALBUTEROL 0.63 MG: 0.63 SOLUTION RESPIRATORY (INHALATION) at 16:01

## 2018-01-01 RX ADMIN — IPRATROPIUM BROMIDE AND ALBUTEROL SULFATE 1 AMPULE: .5; 3 SOLUTION RESPIRATORY (INHALATION) at 17:42

## 2018-01-01 RX ADMIN — INSULIN LISPRO 3 UNITS: 100 INJECTION, SOLUTION INTRAVENOUS; SUBCUTANEOUS at 16:56

## 2018-01-01 RX ADMIN — INSULIN LISPRO 6 UNITS: 100 INJECTION, SOLUTION INTRAVENOUS; SUBCUTANEOUS at 11:53

## 2018-01-01 RX ADMIN — Medication 10 ML: at 13:09

## 2018-01-01 RX ADMIN — PIPERACILLIN SODIUM, TAZOBACTAM SODIUM 3.38 G: 3; .375 INJECTION, POWDER, LYOPHILIZED, FOR SOLUTION INTRAVENOUS at 12:14

## 2018-01-01 RX ADMIN — INSULIN LISPRO 3 UNITS: 100 INJECTION, SOLUTION INTRAVENOUS; SUBCUTANEOUS at 12:30

## 2018-01-01 RX ADMIN — FORMOTEROL FUMARATE DIHYDRATE 20 MCG: 20 SOLUTION RESPIRATORY (INHALATION) at 21:21

## 2018-01-01 RX ADMIN — INSULIN LISPRO 6 UNITS: 100 INJECTION, SOLUTION INTRAVENOUS; SUBCUTANEOUS at 17:05

## 2018-01-01 RX ADMIN — ONDANSETRON 4 MG: 2 INJECTION INTRAMUSCULAR; INTRAVENOUS at 00:14

## 2018-01-01 RX ADMIN — SODIUM CHLORIDE 1000 ML: 9 INJECTION, SOLUTION INTRAVENOUS at 12:14

## 2018-01-01 RX ADMIN — BUDESONIDE 500 MCG: 0.5 INHALANT RESPIRATORY (INHALATION) at 08:07

## 2018-01-01 RX ADMIN — DIGOXIN 250 MCG: 0.25 INJECTION INTRAMUSCULAR; INTRAVENOUS at 17:06

## 2018-01-01 RX ADMIN — TOBRAMYCIN SULFATE 200 MG: 40 INJECTION, SOLUTION INTRAMUSCULAR; INTRAVENOUS at 17:06

## 2018-01-01 RX ADMIN — LIDOCAINE HYDROCHLORIDE: 20 SOLUTION ORAL; TOPICAL at 09:45

## 2018-01-01 RX ADMIN — DARBEPOETIN ALFA 40 MCG: 40 INJECTION, SOLUTION INTRAVENOUS; SUBCUTANEOUS at 11:10

## 2018-01-01 RX ADMIN — INSULIN LISPRO 3 UNITS: 100 INJECTION, SOLUTION INTRAVENOUS; SUBCUTANEOUS at 12:52

## 2018-01-01 RX ADMIN — IPRATROPIUM BROMIDE AND ALBUTEROL SULFATE 1 AMPULE: .5; 3 SOLUTION RESPIRATORY (INHALATION) at 21:27

## 2018-01-01 RX ADMIN — LEVALBUTEROL 0.63 MG: 0.63 SOLUTION RESPIRATORY (INHALATION) at 17:13

## 2018-01-01 RX ADMIN — DILTIAZEM HYDROCHLORIDE 5 MG/HR: 5 INJECTION INTRAVENOUS at 22:58

## 2018-01-01 RX ADMIN — Medication 10 ML: at 13:48

## 2018-01-01 RX ADMIN — INSULIN LISPRO 3 UNITS: 100 INJECTION, SOLUTION INTRAVENOUS; SUBCUTANEOUS at 08:56

## 2018-01-01 RX ADMIN — DILTIAZEM HYDROCHLORIDE 5 MG/HR: 5 INJECTION INTRAVENOUS at 18:49

## 2018-01-01 RX ADMIN — IPRATROPIUM BROMIDE AND ALBUTEROL SULFATE 1 AMPULE: .5; 3 SOLUTION RESPIRATORY (INHALATION) at 13:39

## 2018-01-01 RX ADMIN — Medication 10 ML: at 12:40

## 2018-01-01 RX ADMIN — PANTOPRAZOLE SODIUM 40 MG: 40 TABLET, DELAYED RELEASE ORAL at 06:46

## 2018-01-01 RX ADMIN — NYSTATIN 500000 UNITS: 500000 SUSPENSION ORAL at 17:27

## 2018-01-01 RX ADMIN — ALBUMIN (HUMAN) 25 G: 0.25 INJECTION, SOLUTION INTRAVENOUS at 12:34

## 2018-01-01 RX ADMIN — INSULIN LISPRO 2 UNITS: 100 INJECTION, SOLUTION INTRAVENOUS; SUBCUTANEOUS at 12:13

## 2018-01-01 RX ADMIN — IPRATROPIUM BROMIDE AND ALBUTEROL SULFATE 1 AMPULE: .5; 3 SOLUTION RESPIRATORY (INHALATION) at 12:15

## 2018-01-01 RX ADMIN — DILTIAZEM HYDROCHLORIDE 60 MG: 30 TABLET, FILM COATED ORAL at 06:33

## 2018-01-01 RX ADMIN — INSULIN LISPRO 3 UNITS: 100 INJECTION, SOLUTION INTRAVENOUS; SUBCUTANEOUS at 19:42

## 2018-01-01 RX ADMIN — Medication 10 ML: at 13:23

## 2018-01-01 RX ADMIN — Medication 10 ML: at 12:54

## 2018-01-01 RX ADMIN — FORMOTEROL FUMARATE DIHYDRATE 20 MCG: 20 SOLUTION RESPIRATORY (INHALATION) at 09:22

## 2018-01-01 RX ADMIN — Medication 10 ML: at 13:21

## 2018-01-01 RX ADMIN — LEVALBUTEROL 0.63 MG: 0.63 SOLUTION RESPIRATORY (INHALATION) at 16:28

## 2018-01-01 RX ADMIN — Medication 10 ML: at 11:53

## 2018-01-01 RX ADMIN — Medication 10 ML: at 11:04

## 2018-01-01 RX ADMIN — FLUCONAZOLE 100 MG: 2 INJECTION, SOLUTION INTRAVENOUS at 18:45

## 2018-01-01 RX ADMIN — ALBUMIN (HUMAN) 50 G: 0.25 INJECTION, SOLUTION INTRAVENOUS at 12:41

## 2018-01-01 RX ADMIN — FORMOTEROL FUMARATE DIHYDRATE 20 MCG: 20 SOLUTION RESPIRATORY (INHALATION) at 07:42

## 2018-01-01 RX ADMIN — Medication 10 ML: at 13:10

## 2018-01-01 RX ADMIN — ALBUMIN (HUMAN) 25 G: 0.25 INJECTION, SOLUTION INTRAVENOUS at 12:45

## 2018-01-01 RX ADMIN — INSULIN LISPRO 3 UNITS: 100 INJECTION, SOLUTION INTRAVENOUS; SUBCUTANEOUS at 21:05

## 2018-01-01 RX ADMIN — Medication 10 ML: at 09:06

## 2018-01-01 RX ADMIN — VANCOMYCIN HYDROCHLORIDE 500 MG: 500 INJECTION, POWDER, LYOPHILIZED, FOR SOLUTION INTRAVENOUS at 22:12

## 2018-01-01 RX ADMIN — POTASSIUM CHLORIDE 20 MEQ: 29.8 INJECTION, SOLUTION INTRAVENOUS at 10:15

## 2018-01-01 RX ADMIN — IPRATROPIUM BROMIDE AND ALBUTEROL SULFATE 1 AMPULE: .5; 3 SOLUTION RESPIRATORY (INHALATION) at 13:02

## 2018-01-01 RX ADMIN — IPRATROPIUM BROMIDE AND ALBUTEROL SULFATE 1 AMPULE: .5; 3 SOLUTION RESPIRATORY (INHALATION) at 09:09

## 2018-01-01 RX ADMIN — DILTIAZEM HYDROCHLORIDE 60 MG: 30 TABLET, FILM COATED ORAL at 23:23

## 2018-01-01 RX ADMIN — DILTIAZEM HYDROCHLORIDE 10 MG: 5 INJECTION INTRAVENOUS at 18:49

## 2018-01-01 RX ADMIN — NYSTATIN 500000 UNITS: 500000 SUSPENSION ORAL at 09:45

## 2018-01-01 RX ADMIN — METOLAZONE 2.5 MG: 2.5 TABLET ORAL at 07:39

## 2018-01-01 RX ADMIN — DILTIAZEM HYDROCHLORIDE 60 MG: 30 TABLET, FILM COATED ORAL at 00:26

## 2018-01-01 RX ADMIN — Medication 10 ML: at 13:04

## 2018-01-01 RX ADMIN — Medication 10 ML: at 12:51

## 2018-01-01 RX ADMIN — DILTIAZEM HYDROCHLORIDE 60 MG: 30 TABLET, FILM COATED ORAL at 17:27

## 2018-01-01 RX ADMIN — ALBUMIN (HUMAN) 25 G: 0.25 INJECTION, SOLUTION INTRAVENOUS at 12:35

## 2018-01-01 RX ADMIN — Medication 10 ML: at 08:11

## 2018-01-01 RX ADMIN — Medication 10 ML: at 09:46

## 2018-01-01 RX ADMIN — FLUCONAZOLE 100 MG: 2 INJECTION, SOLUTION INTRAVENOUS at 22:28

## 2018-01-01 RX ADMIN — Medication 10 ML: at 20:04

## 2018-01-01 RX ADMIN — Medication 10 ML: at 12:38

## 2018-01-01 RX ADMIN — BUMETANIDE 2 MG: 1 TABLET ORAL at 09:04

## 2018-01-01 RX ADMIN — PANTOPRAZOLE SODIUM 40 MG: 40 TABLET, DELAYED RELEASE ORAL at 06:40

## 2018-01-01 RX ADMIN — HEPARIN SODIUM 5000 UNITS: 10000 INJECTION INTRAVENOUS; SUBCUTANEOUS at 08:18

## 2018-01-01 RX ADMIN — INSULIN LISPRO 6 UNITS: 100 INJECTION, SOLUTION INTRAVENOUS; SUBCUTANEOUS at 09:46

## 2018-01-01 RX ADMIN — Medication 10 ML: at 12:47

## 2018-01-01 RX ADMIN — DILTIAZEM HYDROCHLORIDE 60 MG: 30 TABLET, FILM COATED ORAL at 00:53

## 2018-01-01 RX ADMIN — METOLAZONE 2.5 MG: 2.5 TABLET ORAL at 09:45

## 2018-01-01 RX ADMIN — INSULIN LISPRO 1 UNITS: 100 INJECTION, SOLUTION INTRAVENOUS; SUBCUTANEOUS at 21:38

## 2018-01-01 RX ADMIN — Medication 10 ML: at 04:39

## 2018-01-01 RX ADMIN — ALBUMIN (HUMAN) 25 G: 0.25 INJECTION, SOLUTION INTRAVENOUS at 12:44

## 2018-01-01 RX ADMIN — Medication 10 ML: at 13:00

## 2018-01-01 RX ADMIN — LEVALBUTEROL 0.63 MG: 0.63 SOLUTION RESPIRATORY (INHALATION) at 19:57

## 2018-01-01 RX ADMIN — BUDESONIDE 500 MCG: 0.5 INHALANT RESPIRATORY (INHALATION) at 19:37

## 2018-01-01 RX ADMIN — FORMOTEROL FUMARATE DIHYDRATE 20 MCG: 20 SOLUTION RESPIRATORY (INHALATION) at 19:37

## 2018-01-01 RX ADMIN — DILTIAZEM HYDROCHLORIDE 30 MG: 30 TABLET, FILM COATED ORAL at 11:30

## 2018-01-01 RX ADMIN — HEPARIN SODIUM 5000 UNITS: 10000 INJECTION INTRAVENOUS; SUBCUTANEOUS at 09:05

## 2018-01-01 RX ADMIN — DIGOXIN 250 MCG: 0.25 INJECTION INTRAMUSCULAR; INTRAVENOUS at 22:57

## 2018-01-01 RX ADMIN — ERTAPENEM SODIUM 500 MG: 1 INJECTION, POWDER, LYOPHILIZED, FOR SOLUTION INTRAMUSCULAR; INTRAVENOUS at 11:34

## 2018-01-01 RX ADMIN — FORMOTEROL FUMARATE DIHYDRATE 20 MCG: 20 SOLUTION RESPIRATORY (INHALATION) at 21:39

## 2018-01-01 RX ADMIN — POTASSIUM CHLORIDE 20 MEQ: 29.8 INJECTION, SOLUTION INTRAVENOUS at 06:57

## 2018-01-01 RX ADMIN — DIGOXIN 62.5 MCG: 125 TABLET ORAL at 14:55

## 2018-01-01 RX ADMIN — ACETAMINOPHEN 650 MG: 325 TABLET, FILM COATED ORAL at 21:30

## 2018-01-01 RX ADMIN — LEVALBUTEROL 0.63 MG: 0.63 SOLUTION RESPIRATORY (INHALATION) at 12:58

## 2018-01-01 RX ADMIN — BUMETANIDE 2 MG: 0.25 INJECTION INTRAMUSCULAR; INTRAVENOUS at 20:29

## 2018-01-01 RX ADMIN — Medication 10 ML: at 22:29

## 2018-01-01 RX ADMIN — BUDESONIDE 500 MCG: 0.5 INHALANT RESPIRATORY (INHALATION) at 09:09

## 2018-01-01 RX ADMIN — PIPERACILLIN SODIUM, TAZOBACTAM SODIUM 3.38 G: 3; .375 INJECTION, POWDER, LYOPHILIZED, FOR SOLUTION INTRAVENOUS at 05:07

## 2018-01-01 RX ADMIN — PANTOPRAZOLE SODIUM 40 MG: 40 TABLET, DELAYED RELEASE ORAL at 06:36

## 2018-01-01 RX ADMIN — FORMOTEROL FUMARATE DIHYDRATE 20 MCG: 20 SOLUTION RESPIRATORY (INHALATION) at 21:27

## 2018-01-01 RX ADMIN — WARFARIN SODIUM 1 MG: 1 TABLET ORAL at 17:22

## 2018-01-01 RX ADMIN — ACETYLCYSTEINE 600 MG: 200 SOLUTION ORAL; RESPIRATORY (INHALATION) at 21:28

## 2018-01-01 RX ADMIN — PIPERACILLIN SODIUM, TAZOBACTAM SODIUM 3.38 G: 3; .375 INJECTION, POWDER, LYOPHILIZED, FOR SOLUTION INTRAVENOUS at 04:41

## 2018-01-01 RX ADMIN — WARFARIN SODIUM 1 MG: 1 TABLET ORAL at 18:02

## 2018-01-01 RX ADMIN — SEVELAMER CARBONATE 800 MG: 800 TABLET, FILM COATED ORAL at 11:51

## 2018-01-01 RX ADMIN — ALBUMIN (HUMAN) 25 G: 0.25 INJECTION, SOLUTION INTRAVENOUS at 12:42

## 2018-01-01 RX ADMIN — FLUCONAZOLE 100 MG: 2 INJECTION, SOLUTION INTRAVENOUS at 20:01

## 2018-01-01 RX ADMIN — Medication 10 ML: at 13:50

## 2018-01-01 RX ADMIN — DILTIAZEM HYDROCHLORIDE 60 MG: 30 TABLET, FILM COATED ORAL at 06:15

## 2018-01-01 RX ADMIN — Medication 10 ML: at 12:35

## 2018-01-01 RX ADMIN — ALBUMIN (HUMAN) 50 G: 0.25 INJECTION, SOLUTION INTRAVENOUS at 13:05

## 2018-01-01 RX ADMIN — FORMOTEROL FUMARATE DIHYDRATE 20 MCG: 20 SOLUTION RESPIRATORY (INHALATION) at 08:18

## 2018-01-01 RX ADMIN — BUDESONIDE 500 MCG: 0.5 INHALANT RESPIRATORY (INHALATION) at 19:50

## 2018-01-01 RX ADMIN — Medication 10 ML: at 11:51

## 2018-01-01 RX ADMIN — HEPARIN SODIUM 5000 UNITS: 10000 INJECTION INTRAVENOUS; SUBCUTANEOUS at 21:30

## 2018-01-01 RX ADMIN — PANTOPRAZOLE SODIUM 40 MG: 40 TABLET, DELAYED RELEASE ORAL at 06:10

## 2018-01-01 RX ADMIN — Medication 10 ML: at 13:22

## 2018-01-01 RX ADMIN — ALBUMIN (HUMAN) 50 G: 0.25 INJECTION, SOLUTION INTRAVENOUS at 12:31

## 2018-01-01 RX ADMIN — BUDESONIDE 500 MCG: 0.5 INHALANT RESPIRATORY (INHALATION) at 19:56

## 2018-01-01 RX ADMIN — FORMOTEROL FUMARATE DIHYDRATE 20 MCG: 20 SOLUTION RESPIRATORY (INHALATION) at 19:34

## 2018-01-01 RX ADMIN — BUMETANIDE 2 MG: 0.25 INJECTION INTRAMUSCULAR; INTRAVENOUS at 20:04

## 2018-01-01 RX ADMIN — DILTIAZEM HYDROCHLORIDE 30 MG: 30 TABLET, FILM COATED ORAL at 11:01

## 2018-01-01 RX ADMIN — FLUCONAZOLE 100 MG: 2 INJECTION, SOLUTION INTRAVENOUS at 19:19

## 2018-01-01 RX ADMIN — Medication 10 ML: at 18:47

## 2018-01-01 RX ADMIN — IPRATROPIUM BROMIDE AND ALBUTEROL SULFATE 1 AMPULE: .5; 3 SOLUTION RESPIRATORY (INHALATION) at 16:47

## 2018-01-01 RX ADMIN — Medication 10 ML: at 17:37

## 2018-01-01 RX ADMIN — INSULIN LISPRO 2 UNITS: 100 INJECTION, SOLUTION INTRAVENOUS; SUBCUTANEOUS at 20:06

## 2018-01-01 RX ADMIN — METOLAZONE 2.5 MG: 2.5 TABLET ORAL at 08:28

## 2018-01-01 RX ADMIN — LEVALBUTEROL 0.63 MG: 0.63 SOLUTION RESPIRATORY (INHALATION) at 19:39

## 2018-01-01 RX ADMIN — INSULIN LISPRO 2 UNITS: 100 INJECTION, SOLUTION INTRAVENOUS; SUBCUTANEOUS at 22:11

## 2018-01-01 RX ADMIN — Medication 10 ML: at 12:43

## 2018-01-01 RX ADMIN — DILTIAZEM HYDROCHLORIDE 60 MG: 30 TABLET, FILM COATED ORAL at 21:51

## 2018-01-01 RX ADMIN — POTASSIUM CHLORIDE 40 MEQ: 20 TABLET, EXTENDED RELEASE ORAL at 13:28

## 2018-01-01 RX ADMIN — LEVALBUTEROL 0.63 MG: 0.63 SOLUTION RESPIRATORY (INHALATION) at 19:55

## 2018-01-01 RX ADMIN — ALBUMIN (HUMAN) 25 G: 0.25 INJECTION, SOLUTION INTRAVENOUS at 12:05

## 2018-01-01 RX ADMIN — Medication 10 ML: at 13:28

## 2018-01-01 RX ADMIN — FORMOTEROL FUMARATE DIHYDRATE 20 MCG: 20 SOLUTION RESPIRATORY (INHALATION) at 19:56

## 2018-01-01 RX ADMIN — FORMOTEROL FUMARATE DIHYDRATE 20 MCG: 20 SOLUTION RESPIRATORY (INHALATION) at 07:57

## 2018-01-01 RX ADMIN — PIPERACILLIN SODIUM, TAZOBACTAM SODIUM 3.38 G: 3; .375 INJECTION, POWDER, LYOPHILIZED, FOR SOLUTION INTRAVENOUS at 17:23

## 2018-01-01 RX ADMIN — DARBEPOETIN ALFA 40 MCG: 40 INJECTION, SOLUTION INTRAVENOUS; SUBCUTANEOUS at 11:52

## 2018-01-01 RX ADMIN — NYSTATIN 500000 UNITS: 500000 SUSPENSION ORAL at 15:48

## 2018-01-01 RX ADMIN — LEVALBUTEROL 0.63 MG: 0.63 SOLUTION RESPIRATORY (INHALATION) at 18:25

## 2018-01-01 RX ADMIN — ALBUMIN (HUMAN) 25 G: 0.25 INJECTION, SOLUTION INTRAVENOUS at 12:50

## 2018-01-01 RX ADMIN — LEVALBUTEROL 0.63 MG: 0.63 SOLUTION RESPIRATORY (INHALATION) at 09:32

## 2018-01-01 RX ADMIN — BUDESONIDE 500 MCG: 0.5 INHALANT RESPIRATORY (INHALATION) at 08:18

## 2018-01-01 RX ADMIN — HEPARIN SODIUM 5000 UNITS: 10000 INJECTION INTRAVENOUS; SUBCUTANEOUS at 17:15

## 2018-01-01 RX ADMIN — POTASSIUM CHLORIDE 20 MEQ: 20 TABLET, EXTENDED RELEASE ORAL at 10:46

## 2018-01-01 RX ADMIN — Medication 10 ML: at 13:03

## 2018-01-01 RX ADMIN — BUDESONIDE 500 MCG: 0.5 INHALANT RESPIRATORY (INHALATION) at 21:21

## 2018-01-01 RX ADMIN — MAGNESIUM SULFATE HEPTAHYDRATE 1 G: 1 INJECTION, SOLUTION INTRAVENOUS at 11:42

## 2018-01-01 RX ADMIN — DILTIAZEM HYDROCHLORIDE 60 MG: 30 TABLET, FILM COATED ORAL at 13:28

## 2018-01-01 RX ADMIN — Medication 10 ML: at 08:29

## 2018-01-01 RX ADMIN — IPRATROPIUM BROMIDE AND ALBUTEROL SULFATE 1 AMPULE: .5; 3 SOLUTION RESPIRATORY (INHALATION) at 19:37

## 2018-01-01 RX ADMIN — Medication 10 ML: at 08:56

## 2018-01-01 RX ADMIN — Medication 10 ML: at 13:49

## 2018-01-01 RX ADMIN — Medication 10 ML: at 06:48

## 2018-01-01 RX ADMIN — ALBUMIN (HUMAN) 25 G: 0.25 INJECTION, SOLUTION INTRAVENOUS at 04:48

## 2018-01-01 RX ADMIN — BUDESONIDE 500 MCG: 0.5 INHALANT RESPIRATORY (INHALATION) at 09:22

## 2018-01-01 RX ADMIN — ALBUMIN (HUMAN) 50 G: 0.25 INJECTION, SOLUTION INTRAVENOUS at 12:58

## 2018-01-01 RX ADMIN — BUDESONIDE 500 MCG: 0.5 INHALANT RESPIRATORY (INHALATION) at 21:39

## 2018-01-01 RX ADMIN — IPRATROPIUM BROMIDE AND ALBUTEROL SULFATE 1 AMPULE: .5; 3 SOLUTION RESPIRATORY (INHALATION) at 08:08

## 2018-01-01 RX ADMIN — LEVALBUTEROL 0.63 MG: 0.63 SOLUTION RESPIRATORY (INHALATION) at 11:35

## 2018-01-01 RX ADMIN — PIPERACILLIN SODIUM, TAZOBACTAM SODIUM 3.38 G: 3; .375 INJECTION, POWDER, LYOPHILIZED, FOR SOLUTION INTRAVENOUS at 17:06

## 2018-01-01 RX ADMIN — Medication 10 ML: at 13:18

## 2018-01-01 RX ADMIN — DILTIAZEM HYDROCHLORIDE 30 MG: 30 TABLET, FILM COATED ORAL at 08:57

## 2018-01-01 RX ADMIN — Medication 10 ML: at 07:40

## 2018-01-01 RX ADMIN — SEVELAMER CARBONATE 800 MG: 800 TABLET, FILM COATED ORAL at 17:04

## 2018-01-01 RX ADMIN — IPRATROPIUM BROMIDE AND ALBUTEROL SULFATE 1 AMPULE: .5; 3 SOLUTION RESPIRATORY (INHALATION) at 08:07

## 2018-01-01 RX ADMIN — METOLAZONE 2.5 MG: 2.5 TABLET ORAL at 09:24

## 2018-01-01 RX ADMIN — BUDESONIDE 500 MCG: 0.5 INHALANT RESPIRATORY (INHALATION) at 21:27

## 2018-01-01 RX ADMIN — INSULIN LISPRO 3 UNITS: 100 INJECTION, SOLUTION INTRAVENOUS; SUBCUTANEOUS at 22:23

## 2018-01-01 RX ADMIN — SODIUM CHLORIDE: 9 INJECTION, SOLUTION INTRAVENOUS at 14:45

## 2018-01-01 RX ADMIN — Medication 10 ML: at 20:25

## 2018-01-01 RX ADMIN — IPRATROPIUM BROMIDE AND ALBUTEROL SULFATE 1 AMPULE: .5; 3 SOLUTION RESPIRATORY (INHALATION) at 21:21

## 2018-01-01 RX ADMIN — ERTAPENEM SODIUM 500 MG: 1 INJECTION, POWDER, LYOPHILIZED, FOR SOLUTION INTRAMUSCULAR; INTRAVENOUS at 10:56

## 2018-01-01 RX ADMIN — HEPARIN SODIUM 5000 UNITS: 10000 INJECTION INTRAVENOUS; SUBCUTANEOUS at 07:41

## 2018-01-01 RX ADMIN — FORMOTEROL FUMARATE DIHYDRATE 20 MCG: 20 SOLUTION RESPIRATORY (INHALATION) at 08:07

## 2018-01-01 RX ADMIN — INSULIN LISPRO 4 UNITS: 100 INJECTION, SOLUTION INTRAVENOUS; SUBCUTANEOUS at 20:49

## 2018-01-01 RX ADMIN — INSULIN LISPRO 3 UNITS: 100 INJECTION, SOLUTION INTRAVENOUS; SUBCUTANEOUS at 08:47

## 2018-01-01 RX ADMIN — Medication 10 ML: at 22:06

## 2018-01-01 RX ADMIN — DILTIAZEM HYDROCHLORIDE 60 MG: 30 TABLET, FILM COATED ORAL at 14:27

## 2018-01-01 RX ADMIN — Medication 10 ML: at 21:40

## 2018-01-01 RX ADMIN — INSULIN LISPRO 3 UNITS: 100 INJECTION, SOLUTION INTRAVENOUS; SUBCUTANEOUS at 17:47

## 2018-01-01 RX ADMIN — METOPROLOL TARTRATE 2.5 MG: 5 INJECTION, SOLUTION INTRAVENOUS at 06:47

## 2018-01-01 RX ADMIN — FORMOTEROL FUMARATE DIHYDRATE 20 MCG: 20 SOLUTION RESPIRATORY (INHALATION) at 09:30

## 2018-01-01 RX ADMIN — Medication 10 ML: at 12:39

## 2018-01-01 RX ADMIN — Medication 10 ML: at 12:45

## 2018-01-01 RX ADMIN — FORMOTEROL FUMARATE DIHYDRATE 20 MCG: 20 SOLUTION RESPIRATORY (INHALATION) at 08:08

## 2018-01-01 RX ADMIN — Medication 10 ML: at 13:47

## 2018-01-01 RX ADMIN — DILTIAZEM HYDROCHLORIDE 30 MG: 30 TABLET, FILM COATED ORAL at 17:33

## 2018-01-01 RX ADMIN — INSULIN LISPRO 1 UNITS: 100 INJECTION, SOLUTION INTRAVENOUS; SUBCUTANEOUS at 17:23

## 2018-01-01 RX ADMIN — PIPERACILLIN SODIUM, TAZOBACTAM SODIUM 3.38 G: 3; .375 INJECTION, POWDER, LYOPHILIZED, FOR SOLUTION INTRAVENOUS at 21:28

## 2018-01-01 RX ADMIN — Medication 10 ML: at 13:44

## 2018-01-01 RX ADMIN — DILTIAZEM HYDROCHLORIDE 60 MG: 30 TABLET, FILM COATED ORAL at 06:19

## 2018-01-01 RX ADMIN — Medication 10 ML: at 13:05

## 2018-01-01 RX ADMIN — FORMOTEROL FUMARATE DIHYDRATE 20 MCG: 20 SOLUTION RESPIRATORY (INHALATION) at 09:09

## 2018-01-01 RX ADMIN — INSULIN LISPRO 2 UNITS: 100 INJECTION, SOLUTION INTRAVENOUS; SUBCUTANEOUS at 17:28

## 2018-01-01 RX ADMIN — Medication 10 ML: at 11:26

## 2018-01-01 RX ADMIN — SODIUM CHLORIDE 1000 ML: 9 INJECTION, SOLUTION INTRAVENOUS at 11:38

## 2018-01-01 RX ADMIN — Medication 10 ML: at 12:42

## 2018-01-01 RX ADMIN — NYSTATIN 500000 UNITS: 500000 SUSPENSION ORAL at 12:30

## 2018-01-01 RX ADMIN — BUMETANIDE 2 MG: 1 TABLET ORAL at 15:56

## 2018-01-01 RX ADMIN — FORMOTEROL FUMARATE DIHYDRATE 20 MCG: 20 SOLUTION RESPIRATORY (INHALATION) at 07:47

## 2018-01-01 RX ADMIN — BUDESONIDE 500 MCG: 0.5 INHALANT RESPIRATORY (INHALATION) at 08:08

## 2018-01-01 RX ADMIN — Medication 10 ML: at 05:23

## 2018-01-01 RX ADMIN — BUDESONIDE 500 MCG: 0.5 INHALANT RESPIRATORY (INHALATION) at 19:55

## 2018-01-01 RX ADMIN — BUMETANIDE 2 MG: 0.25 INJECTION INTRAMUSCULAR; INTRAVENOUS at 10:48

## 2018-01-01 RX ADMIN — ALBUMIN (HUMAN) 50 G: 0.25 INJECTION, SOLUTION INTRAVENOUS at 13:04

## 2018-01-01 RX ADMIN — Medication 10 ML: at 13:17

## 2018-01-01 RX ADMIN — INSULIN LISPRO 2 UNITS: 100 INJECTION, SOLUTION INTRAVENOUS; SUBCUTANEOUS at 16:51

## 2018-01-01 RX ADMIN — BUMETANIDE 2 MG: 0.25 INJECTION INTRAMUSCULAR; INTRAVENOUS at 07:40

## 2018-01-01 RX ADMIN — ALBUMIN (HUMAN) 25 G: 0.25 INJECTION, SOLUTION INTRAVENOUS at 20:22

## 2018-01-01 RX ADMIN — INSULIN LISPRO 6 UNITS: 100 INJECTION, SOLUTION INTRAVENOUS; SUBCUTANEOUS at 09:05

## 2018-01-01 RX ADMIN — FORMOTEROL FUMARATE DIHYDRATE 20 MCG: 20 SOLUTION RESPIRATORY (INHALATION) at 07:39

## 2018-01-01 RX ADMIN — FORMOTEROL FUMARATE DIHYDRATE 20 MCG: 20 SOLUTION RESPIRATORY (INHALATION) at 19:55

## 2018-01-01 RX ADMIN — BUDESONIDE 500 MCG: 0.5 INHALANT RESPIRATORY (INHALATION) at 20:29

## 2018-01-01 RX ADMIN — DILTIAZEM HYDROCHLORIDE 10 MG: 5 INJECTION INTRAVENOUS at 22:54

## 2018-01-01 RX ADMIN — FORMOTEROL FUMARATE DIHYDRATE 20 MCG: 20 SOLUTION RESPIRATORY (INHALATION) at 20:07

## 2018-01-01 RX ADMIN — NYSTATIN 500000 UNITS: 500000 SUSPENSION ORAL at 16:49

## 2018-01-01 RX ADMIN — HEPARIN SODIUM 5000 UNITS: 10000 INJECTION INTRAVENOUS; SUBCUTANEOUS at 20:35

## 2018-01-01 RX ADMIN — BUMETANIDE 2 MG: 1 TABLET ORAL at 08:56

## 2018-01-01 RX ADMIN — Medication 10 ML: at 13:45

## 2018-01-01 RX ADMIN — BUMETANIDE 2 MG: 0.25 INJECTION INTRAMUSCULAR; INTRAVENOUS at 22:06

## 2018-01-01 RX ADMIN — DILTIAZEM HYDROCHLORIDE 60 MG: 30 TABLET, FILM COATED ORAL at 05:14

## 2018-01-01 RX ADMIN — IPRATROPIUM BROMIDE AND ALBUTEROL SULFATE 1 AMPULE: .5; 3 SOLUTION RESPIRATORY (INHALATION) at 17:56

## 2018-01-01 RX ADMIN — DILTIAZEM HYDROCHLORIDE 60 MG: 30 TABLET, FILM COATED ORAL at 18:21

## 2018-01-01 RX ADMIN — DILTIAZEM HYDROCHLORIDE 60 MG: 30 TABLET, FILM COATED ORAL at 11:30

## 2018-01-01 RX ADMIN — BUDESONIDE 500 MCG: 0.5 INHALANT RESPIRATORY (INHALATION) at 09:25

## 2018-01-01 RX ADMIN — ALBUMIN (HUMAN) 25 G: 12.5 SOLUTION INTRAVENOUS at 12:33

## 2018-01-01 RX ADMIN — BUMETANIDE 2 MG: 1 TABLET ORAL at 18:46

## 2018-01-01 RX ADMIN — IPRATROPIUM BROMIDE AND ALBUTEROL SULFATE 1 AMPULE: .5; 3 SOLUTION RESPIRATORY (INHALATION) at 20:10

## 2018-01-01 RX ADMIN — SEVELAMER CARBONATE 800 MG: 800 TABLET, FILM COATED ORAL at 17:21

## 2018-01-01 RX ADMIN — LEVALBUTEROL 0.63 MG: 0.63 SOLUTION RESPIRATORY (INHALATION) at 21:39

## 2018-01-01 RX ADMIN — INSULIN LISPRO 2 UNITS: 100 INJECTION, SOLUTION INTRAVENOUS; SUBCUTANEOUS at 07:40

## 2018-01-01 RX ADMIN — ERTAPENEM SODIUM 500 MG: 1 INJECTION, POWDER, LYOPHILIZED, FOR SOLUTION INTRAMUSCULAR; INTRAVENOUS at 11:53

## 2018-01-01 RX ADMIN — NYSTATIN 500000 UNITS: 500000 SUSPENSION ORAL at 21:25

## 2018-01-01 RX ADMIN — POTASSIUM CHLORIDE 40 MEQ: 20 TABLET, EXTENDED RELEASE ORAL at 09:05

## 2018-01-01 RX ADMIN — BUDESONIDE 500 MCG: 0.5 INHALANT RESPIRATORY (INHALATION) at 07:47

## 2018-01-01 RX ADMIN — INSULIN LISPRO 3 UNITS: 100 INJECTION, SOLUTION INTRAVENOUS; SUBCUTANEOUS at 14:27

## 2018-01-01 RX ADMIN — INSULIN LISPRO 3 UNITS: 100 INJECTION, SOLUTION INTRAVENOUS; SUBCUTANEOUS at 09:04

## 2018-01-01 RX ADMIN — WARFARIN SODIUM 2.5 MG: 2.5 TABLET ORAL at 17:35

## 2018-01-01 RX ADMIN — WARFARIN SODIUM 1 MG: 1 TABLET ORAL at 17:34

## 2018-01-01 RX ADMIN — PIPERACILLIN SODIUM, TAZOBACTAM SODIUM 3.38 G: 3; .375 INJECTION, POWDER, LYOPHILIZED, FOR SOLUTION INTRAVENOUS at 04:38

## 2018-01-01 RX ADMIN — IPRATROPIUM BROMIDE AND ALBUTEROL SULFATE 1 AMPULE: .5; 3 SOLUTION RESPIRATORY (INHALATION) at 12:12

## 2018-01-01 RX ADMIN — Medication 10 ML: at 18:30

## 2018-01-01 RX ADMIN — HEPARIN SODIUM 5000 UNITS: 10000 INJECTION INTRAVENOUS; SUBCUTANEOUS at 14:05

## 2018-01-01 RX ADMIN — FLUCONAZOLE 100 MG: 2 INJECTION, SOLUTION INTRAVENOUS at 19:09

## 2018-01-01 RX ADMIN — INSULIN LISPRO 4 UNITS: 100 INJECTION, SOLUTION INTRAVENOUS; SUBCUTANEOUS at 08:00

## 2018-01-01 RX ADMIN — METOLAZONE 2.5 MG: 2.5 TABLET ORAL at 10:48

## 2018-01-01 RX ADMIN — Medication 10 ML: at 21:18

## 2018-01-01 RX ADMIN — PANTOPRAZOLE SODIUM 40 MG: 40 TABLET, DELAYED RELEASE ORAL at 06:16

## 2018-01-01 RX ADMIN — BUDESONIDE 500 MCG: 0.5 INHALANT RESPIRATORY (INHALATION) at 07:39

## 2018-01-01 RX ADMIN — DILTIAZEM HYDROCHLORIDE 60 MG: 30 TABLET, FILM COATED ORAL at 17:36

## 2018-01-01 RX ADMIN — NYSTATIN 500000 UNITS: 500000 SUSPENSION ORAL at 20:04

## 2018-01-01 RX ADMIN — DILTIAZEM HYDROCHLORIDE 60 MG: 30 TABLET, FILM COATED ORAL at 19:07

## 2018-01-01 RX ADMIN — Medication 10 ML: at 20:29

## 2018-01-01 RX ADMIN — BUMETANIDE 2 MG: 1 TABLET ORAL at 17:21

## 2018-01-01 RX ADMIN — FORMOTEROL FUMARATE DIHYDRATE 20 MCG: 20 SOLUTION RESPIRATORY (INHALATION) at 20:29

## 2018-01-01 RX ADMIN — ERTAPENEM SODIUM 500 MG: 1 INJECTION, POWDER, LYOPHILIZED, FOR SOLUTION INTRAMUSCULAR; INTRAVENOUS at 12:27

## 2018-01-01 RX ADMIN — Medication 10 ML: at 09:04

## 2018-01-01 RX ADMIN — POTASSIUM CHLORIDE 20 MEQ: 20 SOLUTION ORAL at 09:45

## 2018-01-01 RX ADMIN — INSULIN LISPRO 2 UNITS: 100 INJECTION, SOLUTION INTRAVENOUS; SUBCUTANEOUS at 12:46

## 2018-01-01 RX ADMIN — Medication 10 ML: at 12:17

## 2018-01-01 RX ADMIN — ERTAPENEM SODIUM 500 MG: 1 INJECTION, POWDER, LYOPHILIZED, FOR SOLUTION INTRAMUSCULAR; INTRAVENOUS at 12:46

## 2018-01-01 RX ADMIN — INSULIN LISPRO 1 UNITS: 100 INJECTION, SOLUTION INTRAVENOUS; SUBCUTANEOUS at 21:07

## 2018-01-01 RX ADMIN — SEVELAMER CARBONATE 800 MG: 800 TABLET, FILM COATED ORAL at 09:04

## 2018-01-01 RX ADMIN — LEVALBUTEROL 0.63 MG: 0.63 SOLUTION RESPIRATORY (INHALATION) at 12:01

## 2018-01-01 RX ADMIN — Medication 10 ML: at 13:08

## 2018-01-01 RX ADMIN — Medication 10 ML: at 19:19

## 2018-01-01 RX ADMIN — POTASSIUM CHLORIDE 20 MEQ: 29.8 INJECTION, SOLUTION INTRAVENOUS at 07:18

## 2018-01-01 RX ADMIN — BUDESONIDE 500 MCG: 0.5 INHALANT RESPIRATORY (INHALATION) at 19:30

## 2018-01-01 RX ADMIN — FLUCONAZOLE 100 MG: 2 INJECTION, SOLUTION INTRAVENOUS at 19:42

## 2018-01-01 RX ADMIN — HEPARIN SODIUM 5000 UNITS: 10000 INJECTION INTRAVENOUS; SUBCUTANEOUS at 14:55

## 2018-01-01 RX ADMIN — ERTAPENEM SODIUM 500 MG: 1 INJECTION, POWDER, LYOPHILIZED, FOR SOLUTION INTRAMUSCULAR; INTRAVENOUS at 11:26

## 2018-01-01 RX ADMIN — BUMETANIDE 2 MG: 1 TABLET ORAL at 18:30

## 2018-01-01 RX ADMIN — Medication 10 ML: at 12:30

## 2018-01-01 RX ADMIN — LEVALBUTEROL 0.63 MG: 0.63 SOLUTION RESPIRATORY (INHALATION) at 07:57

## 2018-01-01 RX ADMIN — Medication 10 ML: at 09:44

## 2018-01-01 RX ADMIN — Medication 10 ML: at 12:31

## 2018-01-01 RX ADMIN — Medication 10 ML: at 19:42

## 2018-01-01 RX ADMIN — Medication 10 ML: at 13:20

## 2018-01-01 RX ADMIN — BUDESONIDE 500 MCG: 0.5 INHALANT RESPIRATORY (INHALATION) at 20:07

## 2018-01-01 RX ADMIN — BUDESONIDE 500 MCG: 0.5 INHALANT RESPIRATORY (INHALATION) at 07:57

## 2018-01-01 RX ADMIN — BUDESONIDE 500 MCG: 0.5 INHALANT RESPIRATORY (INHALATION) at 07:42

## 2018-01-01 RX ADMIN — DILTIAZEM HYDROCHLORIDE 15 MG/HR: 5 INJECTION INTRAVENOUS at 05:10

## 2018-01-01 RX ADMIN — Medication 10 ML: at 13:27

## 2018-01-01 RX ADMIN — VANCOMYCIN HYDROCHLORIDE 1000 MG: 1 INJECTION, POWDER, LYOPHILIZED, FOR SOLUTION INTRAVENOUS at 17:37

## 2018-01-01 RX ADMIN — ERTAPENEM SODIUM 500 MG: 1 INJECTION, POWDER, LYOPHILIZED, FOR SOLUTION INTRAMUSCULAR; INTRAVENOUS at 10:46

## 2018-01-01 RX ADMIN — PANTOPRAZOLE SODIUM 40 MG: 40 TABLET, DELAYED RELEASE ORAL at 11:04

## 2018-01-01 RX ADMIN — ALBUMIN (HUMAN) 25 G: 0.25 INJECTION, SOLUTION INTRAVENOUS at 12:49

## 2018-01-01 RX ADMIN — IPRATROPIUM BROMIDE AND ALBUTEROL SULFATE 1 AMPULE: .5; 3 SOLUTION RESPIRATORY (INHALATION) at 08:40

## 2018-01-01 RX ADMIN — BUDESONIDE 500 MCG: 0.5 INHALANT RESPIRATORY (INHALATION) at 20:10

## 2018-01-01 RX ADMIN — BUMETANIDE 2 MG: 0.25 INJECTION INTRAMUSCULAR; INTRAVENOUS at 21:18

## 2018-01-01 RX ADMIN — DILTIAZEM HYDROCHLORIDE 60 MG: 30 TABLET, FILM COATED ORAL at 12:40

## 2018-01-01 ASSESSMENT — PULMONARY FUNCTION TESTS
PIF_VALUE: 0
PIF_VALUE: 0
PIF_VALUE: 1
PIF_VALUE: 0
PIF_VALUE: 3
PIF_VALUE: 0
PIF_VALUE: 0
PIF_VALUE: 1
PIF_VALUE: 0
PIF_VALUE: 0
PIF_VALUE: 1
PIF_VALUE: 0
PIF_VALUE: 1
PIF_VALUE: 0
PIF_VALUE: 1
PIF_VALUE: 0
PIF_VALUE: 30
PIF_VALUE: 0
PIF_VALUE: 30
PIF_VALUE: 0

## 2018-01-01 ASSESSMENT — PAIN SCALES - GENERAL
PAINLEVEL_OUTOF10: 0
PAINLEVEL_OUTOF10: 7
PAINLEVEL_OUTOF10: 0

## 2018-01-01 ASSESSMENT — PAIN DESCRIPTION - DESCRIPTORS: DESCRIPTORS: TIGHTNESS;DISCOMFORT;SORE

## 2018-01-01 ASSESSMENT — PAIN DESCRIPTION - LOCATION: LOCATION: ABDOMEN

## 2018-01-01 ASSESSMENT — PAIN DESCRIPTION - PAIN TYPE: TYPE: ACUTE PAIN

## 2018-01-01 ASSESSMENT — ENCOUNTER SYMPTOMS: SHORTNESS OF BREATH: 1

## 2018-03-15 PROBLEM — E88.09 HYPERALBUMINEMIA: Status: ACTIVE | Noted: 2018-01-01

## 2018-03-15 PROBLEM — E88.09 HYPOALBUMINEMIA: Status: ACTIVE | Noted: 2018-01-01

## 2018-03-20 NOTE — PROGRESS NOTES
Patient tolerated infusion well. No reportable signs/symptoms at this time. Declines copy of AVS and any medication information since one was received from radiology, verbalizes good knowledge of current plan. Paracentesis site dry and intact. Next appointment scheduled.

## 2018-03-27 NOTE — PROGRESS NOTES
Patient tolerated infusion well. No reportable signs/symptoms at this time. Reviewed AVS with patient, reviewed medication information, verbalizes good knowledge of current plan, and has no signs or symptoms to report at this time. Declines copy of AVS. Next appointment scheduled.

## 2018-04-03 NOTE — PROGRESS NOTES
Tolerated infusion well. Therapy plan reviewed with patient. Verbalizes understanding. Reviewed AVS with patient, reviewed medication information, verbalizes good knowledge of current plan, and has no signs or symptoms to report at this time. Declines copy of AVS. Next appointment scheduled.

## 2018-04-17 NOTE — PROGRESS NOTES
Cancelled hs Albumin infusion for today, not feeling well, will keep next week's scheduled appointment, office notified via fax  Electronically signed by Opal Fleischer on 4/17/2018 at 9:31 AM

## 2018-05-22 NOTE — PROGRESS NOTES
Tolerated infusion well. Therapy plan reviewed with patient. Verbalizes understanding. Reviewed AVS with patient, reviewed medication information, verbalizes good knowledge of current plan, and has no signs or symptoms to report at this time.  Declines copy of AVS. Next appointment scheduled

## 2018-06-05 NOTE — PROGRESS NOTES
Tolerated   Albumin  infusion well. Therapy plan reviewed with patient. Verbalizes understanding. Reviewed AVS with patient, reviewed medication information, verbalizes good knowledge of current plan, and has no signs or symptoms to report at this time. Declines copy of AVS. Patient instructed on s/s infection/complication with midline to report and instructed on keeping midline dressing clean, dry and intact patient verbalizes understanding. Next appointment scheduled.

## 2018-07-03 NOTE — PROGRESS NOTES
Tolerated infusion well. Therapy plan reviewed with patient. Verbalizes understanding. Reviewed AVS with patient, reviewed medication information, verbalizes good knowledge of current plan, and has no signs or symptoms to report at this time. Declines copy of AVS. Patient instructed on s/s infection/complication with midline to report and instructed on keeping midline dressing clean, dry and intact patient verbalizes understanding. Next appointment scheduled.

## 2018-07-10 NOTE — PROGRESS NOTES
Tolerated infusion well. Therapy plan reviewed with patient. Verbalizes understanding. Reviewed AVS with patient, reviewed medication information, verbalizes good knowledge of current plan, and has no signs or symptoms to report at this time.  Declines copy of AVS. Will follow with Dr. Emerson Cao and await further orders after his appointment

## 2018-11-12 PROBLEM — I47.1 PAROXYSMAL SUPRAVENTRICULAR TACHYCARDIA (HCC): Status: ACTIVE | Noted: 2018-01-01

## 2018-11-12 PROBLEM — I38 VHD (VALVULAR HEART DISEASE): Status: ACTIVE | Noted: 2018-01-01

## 2018-11-12 NOTE — PATIENT INSTRUCTIONS
the  may tell you to chew 1 adult-strength or 2 to 4 low-dose aspirin. Wait for an ambulance. Do not try to drive yourself.   Watch closely for changes in your health, and be sure to contact your doctor if you have any problems. Where can you learn more? Go to [email or web address] and sign in to your Telemedicine Clinic account. Enter Z752 in the Readbug box to learn more about \"A Healthy Heart: Care Instructions. \"     If you do not have an account, please click on the \"Sign Up Now\" link. Current as of: December 6, 2017  Content Version: 11.8  © 4969-7238 [employer]wise, Incorporated. Care instructions adapted under license by [facility] ([facility]). If you have questions about a medical condition or this instruction, always ask your healthcare professional. [address] any warranty or liability for your use of this information.

## 2018-11-13 NOTE — PROGRESS NOTES
2.5 mg by nebulization daily , Disp: , Rfl:     simvastatin (ZOCOR) 20 MG tablet, Take 20 mg by mouth nightly.  , Disp: , Rfl:     bumetanide (BUMEX) 2 MG tablet, Take 2 mg by mouth daily, Disp: , Rfl:     doxazosin (CARDURA) 4 MG tablet, Take 4 mg by mouth nightly., Disp: , Rfl:       S: REASON FOR VISIT:    Valvular heart disease, chronic diastolic congestive heart failure, paroxysmal supraventricular tachycardia and multiple risk factors for coronary artery disease. Vikas Lux is a pleasant, 80-year-old male with cardiovascular history as described below. He suffers from end stage renal disease and is on peritoneal dialysis. He makes less than a half cup of urine a day, but was told by his nephrologist, Dr. Robert Roman, to stay on the diuretic therapy. He complains of occasional bloating sensation in his abdomen that, at times, interferes with his breathing and is associated with chest heaviness, for which he takes an occasional sublingual Nitroglycerin. He also has occasional tightness in the chest and shortness of breath that responds to bronchodilator therapy and to oxygen therapy. He has chronic bilateral lower extremity swelling which has not worsened. He denies orthopnea or PND's. He has occasional lightheadedness, but denies presyncope or syncope. Vikas Lux tells me that in general, he has been stable with no new complaints. REVIEW OF SYSTEMS:    CONSTITUTIONAL: Denies fevers, chills, night sweats, or fatigue. HEENT: Denies headaches. Denies changes in hearing or vision. Denies dysphagia, hoarseness, hemoptysis, hematemesis, or epistaxis. ENDOCRINE: Denies polyuria, polydipsia, polyphagia, or heat or cold intolerance. MUSCULOSKELETAL: Denies any significant arthralgias or myalgias. SKIN: He complains of itching. He denies rashes or ulcers. HEME/LYMPH: Denies palpable lymphadenopathy, bleeding, or easy bruisability. HEART: As above. LUNGS: Denies cough or sputum production.   GI: Denies

## 2018-12-03 PROBLEM — A41.9 SEPSIS (HCC): Status: ACTIVE | Noted: 2018-01-01

## 2018-12-03 NOTE — Clinical Note
Patient Class: Inpatient [101]   REQUIRED: Diagnosis: Sepsis ([facility] [address] [[id number]]   Estimated Length of Stay: Estimated stay of more than 2 midnights   Future Attending Provider: Azucena Balderas [[id number]]

## 2018-12-03 NOTE — PROGRESS NOTES
Stage  CI HR MAP TPRI SVI   Baseline 3.5 [id number] 28   Challenge 3.1 [id number]   Result (%Ä) -10.9 -3.8 15.2 64.5 -7.5     PLR completed.

## 2018-12-03 NOTE — ED NOTES
Resident known of patients vital signs and new orders received. Condition remains stable at this time.      Brenden Lewis RN  12/03/18 [id number]

## 2018-12-04 NOTE — PROGRESS NOTES
Clinical pharmacist consulted to dose vancomycin; pt with GNR bacteremia; no more vancomycin. Clinical pharmacist will sign off.   Isidro Bacon PharmD, BCPS, BCCCP 12/4/2018 5:58 PM

## 2018-12-04 NOTE — CARE COORDINATION
Social Work/Discharge Planning;  Briefly spoke with pt's wife in room. Visitors present. Explained Care Transition and Social Work role. Pt per, wife was very independent prior to admission and did not have any services. Pt is on peritoneal dialysis at home. Social Work advised that we will follow pt and complete assessment as pt hospital stay progresses to coordinate discharge planning needs. Wife receptive.   Oscar Curry [facility]

## 2018-12-04 NOTE — H&P
Robert Jacobsen is a 80 y.o. male  with PMH of resolved Tuberculosis, ESRD on peritoneal dialysis, HTN, HLD, DMT2, Valvular disease, Asthma. HFpEF, COPD. Presented to the emergency department with history of one day of generalized weakness, malaise, and fatigue, had difficulty  to get out of bed, and had to use his walker all the time. Also present increasing shortness of breath for the past day and chest tightness and it was very fast.In ER  pt had worsening SOB, pt hypotensive  CXR showed right lung opacification, and Pt found on A fib RVR. Given IVF. And BP slightly improved. Labs showed, Na 129, Cr 7.9. BUN 95, pro BNP 46,375, wbc 22.8 Pt. does his peritoneal dialysis at home every night, lasts for 8 hr. Fluid has not changed in color, it is yellowish but not turbid. Denies abdominal pain, nausea, vomiting, refers constipation and takes lactulose for it. No blood in stool, no melena. Refers he still makes a little amount of urine daily. But has been decreasing steadily. admitted to ICU for treatment     Past Medical History:   Diagnosis Date    Anemia Mild. Diagnosed in 3/2009.  Aortic regurgitation 5/19/14    mild-moderate    Asthma     Bleeding nose     Bronchitis 04/2017    Cervical spinal cord compression (HCC)     History of cervical compression syndrome.  Diabetes mellitus ([address]     Noninsulin dependent.  Dizziness     after nasal cauterization    Enlarged LA (left atrium) 5/14/15    moderately enlarged    Enlargement of right atrium 5/14/15    markedly enlarged    Fall at home 09/27/2015    Hemod[facility] patient [facility])     home nightly    Hyperlipidemia     Hypertension     Kidney disorder     Patient born with one kidney.  Left ventricular hypertrophy 5/14/15    moderate concentric    Mild aortic stenosis 5/14/15    Mild mitral regurgitation 5/14/15    Mild tricuspid regurgitation 5/14/15    Pulmonary hypertension ([address] 5/14/15    moderate    Tuberculosis     Healed.        Past

## 2018-12-04 NOTE — PLAN OF CARE
Problem: Risk for Impaired Skin Integrity  Goal: Tissue integrity - skin and mucous membranes  Structural intactness and normal physiological function of skin and  mucous membranes.    Outcome: Ongoing      Problem: Falls - Risk of:  Goal: Will remain free from falls  Will remain free from falls   Outcome: Met This Shift    Goal: Absence of physical injury  Absence of physical injury   Outcome: Met This Shift      Problem: Pain:  Goal: Pain level will decrease  Pain level will decrease   Outcome: Met This Shift      Problem: Airway Clearance - Ineffective:  Goal: Ability to maintain a clear airway will improve  Ability to maintain a clear airway will improve   Outcome: Ongoing      Problem: Gas Exchange - Impaired:  Goal: Levels of oxygenation will improve  Levels of oxygenation will improve   Outcome: Met This Shift

## 2018-12-04 NOTE — PROGRESS NOTES
[address]   Department of Internal Medicine  Internal Medicine Residency Program  Resident MICU Progress  Note      Patient:  Cassidy Adam 80 y.o. male MRN: [id number]     Date of Service: 12/4/2018    Allergy: Fenofibrate and Lipitor  CC: F/u: SOB  Subjective       Pt examined at bedside. Afib RVR, asymptomatic. CXR still R sided infiltrates, Grew GNR in blood. tobra added 1 dose, vanc stopped. For possible bronch tomorrow. Echo done today. Cardiology consult suggests [address] for AFIB, discussed in rounds, he has a HAS-BLED score 3, risk of bleeding 5.8%, CHADSVASC 5 risk of stroke 7.2 %. Will hold off anticoagulation for now. Will load dig 250 mcg Q6H x 2. BP not stable enough for recommended Cardizem ggt. nephro started albumin and managing PD. CT gall bladder thickening. Will check US and will decide about HIDA scan and general surgery consult.     Objective   Physical Exam:  · Vitals: BP 93/75   Pulse 138   Temp 97.9 °F (36.6 °C) (Oral)   Resp 25   Ht 5' 5\" (1.651 m)   Wt 175 lb (79.4 kg)   SpO2 98%   BMI 29.12 kg/m²     · I & O - 24hr: In: 175 [I.V.:75]  · Out: 414 [Urine:47]  § General Appearance: alert and oriented to person, place and time, well-developed and well-nourished, in no acute distress  § Skin: warm and dry, no rash or erythema  § Head: normocephalic and atraumatic  § Eyes: pupils equal, round, and reactive to light, extraocular eye movements intact, conjunctivae normal  § Neck: neck supple and non tender without mass, no thyromegaly or thyroid nodules, no cervical lymphadenopathy   § Pulmonary/Chest: rhonchi present- bilaterally  § Cardiovascular: normal S1 and S2, no gallops, intact distal pulses and no carotid bruits  § Abdomen: soft, non-tender, bowel sounds normal, no masses, no organomegaly and no abdominal bruits  § Extremities: no cyanosis, no clubbing and no edema  § Musculoskeletal: normal range of motion, no joint swelling, deformity or 10/20/1932 Age:  80 years Gender: Male Order Date: 2018 6:00 AM Exam: XR CHEST PORTABLE Number of Images: 1 view Indication:   SOB SOB Comparison: December 3, 2018. Findings: The heart is unremarkable. There is still noted patchy changes involving the right lung with minimal if any improvement. Central line is seen with the tip in the superior vena cava. The left lung shows heavy markings with pleural parenchymal scarring at the apex. There is tortuosity of the thoracic aorta. No pleural effusion. Chronic rotator cuff cuff tear seen on the left. The aorta is unremarkable. Diffuse infiltrative changes involving the entire lung, particularly the right upper lobe without significant change since the last study obtained the day  before. Xr Chest Portable    Result Date: 12/3/2018  Patient MRN:  [id number] : 10/20/1932 Age: 80 years Gender: Male Order Date:  12/3/2018 9:00 PM TECHNIQUE/NUMBER OF IMAGES/COMPARISON/CLINICAL HISTORY: Chest AP 1 image one view Comparison April 15, 2016, 2016, 2018. History after central venous line placement FINDINGS: Right internal jugular central venous catheter tip in SVC right atrial junction. Extensive process seen in the right lung with loss of volume . There is a apical pleural thickening which has been progressive since previous studies . Cannot exclude a malignancy in the right lung although infiltrate on the basis of pneumonia will be part of the differential diagnosis. There is also progressive thickening of the left apical pleural cap. See report of CT scan of the chest performed same day . No pneumothorax on the right on the left     1. Placement of central venous line through the right internal jugular vein. No pneumothorax on the right or on the left. 2. Please review the report of CT chest of 2018.     Xr Chest Portable    Result Date: 12/3/2018  Patient MRN: [id number] : 10/20/1932 Age:  80 years Gender: Male Order Date: 12/3/2018 11:00 AM Exam: XR CHEST PORTABLE Number of Images: 1 view Indication:   dyspnea, fever dyspnea, fever Comparison: 9/27/2015. Findings: There is now noted evidence of infiltrative changes involving the almost the entire right lung. Particularly the right upper lobe with the known pleuroparenchymal scarring which is seen in both apices more on the right more on the right side. No pleural effusion seen. The cardiac silhouette is not enlarged. There is tortuosity and calcifications of the thoracic aorta. The trachea is slightly shifted to the left of the midline probably due to loss of volume . Infiltrative changes involving the right lung while the left lung is relatively clear. Pleural parenchymal scarring at the apices more so on the right was present  before. Resident's Assessment & Plan     Cardiovascular  Acute exacerbation HFpEF  Pro BNP 39501  Baseline ProBNP 6000  Ck, CKMB ratio 4.74  Cardiology consult  Echo repeated EF 55% No WMA     Afib RVR  Still in 110-120  Treat infection  ECHO paroxysmal SVT in the past, but no reported hx of Afib. Dig 250 mg Q 6h X2  HAS-BLED score 3, risk of bleeding 5.8%, CHADSVASC 5 risk of stroke 7.2 %. Will hold off anticoagulation for now.     Acute hypoxic respiratory failure  Likely due to HR vs PNA  Pt sat O2 94% on NC 2Lt/min  Legionella Ag, Strep pneumo ag. Respiratory panel, sputum culture pending  Breathing treatments  CXR R sided infiltrate similar to yesterday.   For Bronch tomorrow    ESRD on peritoneal dialysis  Cr 7.9  Daily dialysis at night  Protein/cr ratio  micro albumin cr ratio  US abdomen  Nephrology consulted: continue peritoneal dialysis with 1.5% to avoid hypotension, albumin 25 gr IV BID x 6 doses     HAGMA  2/2 ESRD, Lactic acidosis, and uremia  IVF as needed  Monitor bmp     Mild Hyponatremia  In the setting of hyperglycemia and hypervolemia  Corrected 131.      DMT2  A1C 6.9%   Monitor BG  Low dose SS     Septic shock  Blood pressure stable today  2/2 pneumonia  CXR opacities on upper right lobe   Wbc 22.8  Lactic acid 2.7, now trending down  Cortisol 28.62  Peritoneal fluid cell count 30 with 33% neut r/o SBP  Sputum, urine, blood culture>>positive for GNR x 2  Zosyn and 1 dose of tobramycin  Stop vanc    Anemia  normocytic  Anemia of chronic disease  Baseline 9     DVT prophylaxis/ GI prophylaxis: PCD/ Heparin SC  [facility]    Siva Hector M.D., PGY 1    Attending physician: Dr. Mateo Reno  Addendum ICU Attending Amy Reynolds was seen, examined and discussed with the multi-disciplinary ICU team during rounds. A addendum note may be separate to the residents note. If not then, I have personally seen and examined the patient and the key elements of the encounter were performed by me (> 85 % time). The medications & laboratory data was discussed and adjusted where necessary. The radiographic images were reviewed either as a group or with radiologist.  Any changes are document or changed if felt dis-concordant with the exam or history. The above findings were corroborated, plans confirmed and changes made if needed. Family was updated at the bedside as available. Key issues of the case were discussed among consultants. Critical Care time is documented if appropriate.       Ricki Brown DO, MPH  Professor of Medicine

## 2018-12-04 NOTE — CONSULTS
Component Value Date     2018    K 3.6 2018    CL 91 2018    CO2 22 2018    BUN 95 2018    PROT 5.4 2018     U/A:  No components found for: [place], UPH, UPROTEIN, UGLUCOSE, UKETONE, UBILI, UBLOOD, [place], [place], [place], [facility], [place], [place], [place], [place], [place]    Imaging Studies:     Ct Abdomen Pelvis Wo Contrast Additional Contrast? None    Result Date: 12/3/2018  Patient MRN:  [id number] : 10/20/1932 Age: 80 years Gender: Male Order Date:  12/3/2018 11:45 AM EXAM: CT ABDOMEN PELVIS WO CONTRAST number of images 413 Technique: Low-dose CT  acquisition technique included one of following options; 1 . Automated exposure control, 2. Adjustment of MA and or KV according to patient's size or 3. Use of iterative reconstruction. INDICATION:  distended abdomen, abdominal pain  COMPARISON: Previous CT scan 2016 FINDINGS: The lung bases demonstrate cardiomegaly with coronary artery calcification. There is infiltrates in the right middle lobe and atelectasis and tiny pleural effusions in lung bases which may be due to pneumonia or edema. Examination is limited due to the lack of intravenous contrast. Grossly, liver is heterogeneously decreased in attenuation which may be  due to fatty infiltration. There is a moderate amount of high-density ascites with fluid surrounding the liver, paracolic gutters, mesentery and pelvis. A large cystic collection is identified in the expected location of gallbladder which may be inflamed gallbladder. If cholecystitis is a consideration, consider ultrasonography or hepatobiliary scan. Spleen appears normal. There is slight prominence of the uncinate process of pancreas. Small lymph nodes are identified in the peripancreatic area. Adrenals appear grossly unremarkable. Left kidney is atrophic. There is a 4 x 4.6 cm hyperdense lesion in the right kidney with additional small cystic lesions.  Both hemorrhagic or proteinaceous cyst versus solid mass involving the kidneys considered. Degenerative changes are identified in the lumbar spine Pelvis. Bladder is contracted with  wall thickening and Salazar catheter. High-density ascites is present in the pelvis. Colon is collapsed. Appendix is noted. There is a small left inguinal hernia with herniation of ascites fluid into the hernia sac. Limited noncontrast study. There is heterogeneous appearance of liver concerning for liver disease with a moderate amount of high-density ascites. Hemorrhagic or malignant ascites has to be excluded. Apparently thickened and distended gallbladder is also identified. If cholecystitis is considered, consider ultrasonography and or hepatobiliary scan. 4 x 4.6 cm hyperdense lesion in the right kidney which may represent proteinaceous or hemorrhagic cyst or solid mass. There is also slight prominence of the uncinate process of the pancreas. Surveillance is recommended. Infiltrates and atelectasis in the right lung base. Ct Chest Wo Contrast    Result Date: 12/3/2018  Patient MRN:  [id number] : 10/20/1932 Age: 80 years Gender: Male Order Date:  12/3/2018 11:15 AM EXAM: CT CHEST WO CONTRAST number of images 310 Technique: Low-dose CT  acquisition technique included one of following options; 1 . Automated exposure control, 2. Adjustment of MA and or KV according to patient's size or 3. Use of iterative reconstruction. INDICATION:  right lung infilrate vs abscess vs mass  COMPARISON: None FINDINGS: There is borderline cardiac size. 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 The pulmonary artery is prominent. There is coronary artery calcifications. The trachea and major bronchi are patent. There is patchy and masslike infiltrative density in the right upper lobe and right middle lobe which may represent complicated pneumonia. Underlying malignancy is difficult to exclude.  Patchy nodular infiltrative densities

## 2018-12-04 NOTE — CONSULTS
daily    Yes Historical Provider, MD   simvastatin (ZOCOR) 20 MG tablet Take 20 mg by mouth nightly. Yes Historical Provider, MD       Current Medications:    Current Facility-Administered Medications: pantoprazole (PROTONIX) tablet 40 mg, 40 mg, Oral, QAM AC  ipratropium-albuterol (DUONEB) nebulizer solution 1 ampule, 1 ampule, Inhalation, Q4H WA  budesonide (PULMICORT) nebulizer suspension 500 mcg, 500 mcg, Nebulization, BID  formoterol (PERFOROMIST) nebulizer solution 20 mcg, 20 mcg, Nebulization, BID  sodium chloride flush 0.9 % injection 10 mL, 10 mL, Intravenous, 2 times per day  sodium chloride flush 0.9 % injection 10 mL, 10 mL, Intravenous, PRN  magnesium hydroxide (MILK OF MAGNESIA) 400 MG/5ML suspension 30 mL, 30 mL, Oral, Daily PRN  ondansetron (ZOFRAN) injection 4 mg, 4 mg, Intravenous, Q6H PRN  insulin lispro (HUMALOG) injection vial 0-6 Units, 0-6 Units, Subcutaneous, TID WC  insulin lispro (HUMALOG) injection vial 0-3 Units, 0-3 Units, Subcutaneous, Nightly  glucose (GLUTOSE) 40 % oral gel 15 g, 15 g, Oral, PRN  dextrose 50 % solution 12.5 g, 12.5 g, Intravenous, PRN  glucagon (rDNA) injection 1 mg, 1 mg, Intramuscular, PRN  dextrose 5 % solution, 100 mL/hr, Intravenous, PRN  albumin human 25 % solution 25 g, 25 g, Intravenous, Q8H  acetylcysteine (MUCOMYST) 20 % solution 600 mg, 600 mg, Oral, BID  piperacillin-tazobactam (ZOSYN) 3.375 g in dextrose 5 % 100 mL IVPB extended infusion (mini-bag), 3.375 g, Intravenous, Q8H    Allergies:  Fenofibrate and Lipitor    Social History:    ETOH- denies  Tobacco- denies  Illicit- denies    Family History: Not pertinent  d/t advanced age     REVIEW OF SYSTEMS:     · Constitutional: +fatigue, fevers, chills   · Eyes: Denies visual changes or drainage  · ENT: Denies headaches or hearing loss. No mouth sores or sore throat. No epistaxis   · Cardiovascular: See HPI   · Respiratory: + cough, FAUST. No hemoptysis   · Gastrointestinal: Denies hematemesis or anorexia.

## 2018-12-04 NOTE — PROGRESS NOTES
Department of Internal Medicine  Nephrology Progress Note      Subjective: We are following Mr. Moisés Vu for ESRD on CCPD and hyponatremia. Reports feeling better and constipation, no nausea, emesis, or diarrhea.          PHYSICAL EXAM:      Vitals:    VITALS:  /71   Pulse 132   Temp 98.4 °F (36.9 °C) (Temporal)   Resp (!) 31   Ht 5' 5\" (1.651 m)   Wt 175 lb (79.4 kg)   SpO2 99%   BMI 29.12 kg/m²   24HR INTAKE/OUTPUT:      Intake/Output Summary (Last 24 hours) at 12/04/18 0857  Last data filed at 12/04/18 0745   Gross per 24 hour   Intake              574 ml   Output              392 ml   Net              182 ml        Access:  Peripherals   Constitutional:  He is awake alert and oriented X3   HEENT:  EOMI PERRLA, neck supple   Neck: elevated JVD   Respiratory: bilateral crackles and wheezing   Cardiovascular/Edema:  RRR, no murmurs, has elevated JVD and galloping    Gastrointestinal:  Patient with dialysis access in his left LQ and has distended abdomen    Neurologic:  He is awake and alert   Other: No LE edema     Scheduled Meds:   pantoprazole  40 mg Oral QAM AC    piperacillin-tazobactam  3.375 g Intravenous Q12H    vancomycin (VANCOCIN) intermittent dosing (placeholder)   Other RX Placeholder    ipratropium-albuterol  1 ampule Inhalation Q4H WA    budesonide  500 mcg Nebulization BID    formoterol  20 mcg Nebulization BID    sodium chloride flush  10 mL Intravenous 2 times per day    insulin lispro  0-6 Units Subcutaneous TID WC    insulin lispro  0-3 Units Subcutaneous Nightly    albumin human  25 g Intravenous Q8H    acetylcysteine  600 mg Oral BID     Continuous Infusions:   dextrose       PRN Meds:.sodium chloride flush, magnesium hydroxide, ondansetron, glucose, dextrose, glucagon (rDNA), dextrose      DATA:    CBC:   Lab Results   Component Value Date    WBC 25.5 12/04/2018    RBC 3.11 12/04/2018    HGB 9.7 12/04/2018    HCT 29.3 12/04/2018    MCV 94.2 12/04/2018    MCH 31.2

## 2018-12-05 NOTE — PROGRESS NOTES
[address] [facility]  Department of Internal Medicine  Internal Medicine Residency Program  Resident MICU Progress  Note      Patient:  Quentin Gibson 80 y.o. male MRN: [id number]     Date of Service: 12/5/2018    Allergy: Fenofibrate and Lipitor  CC: F/u: SOB  Subjective       Patient see and examined at bedside. Pt referred he was feeling much better, chest tightness was gone, and SOB is improved. Refers no palpitations of fluttering. Mild diffuse abdominal tenderness. No rebound, no frederick. ROS: denies headache, chest pain, nausea, vomiting, diarrhea, constipation, blood in stool or urine.     Objective   Physical Exam:  · Vitals: /74   Pulse 128   Temp 99.1 °F (37.3 °C) (Temporal)   Resp 26   Ht 5' 5\" (1.651 m)   Wt 192 lb 9.6 oz (87.4 kg)   SpO2 98%   BMI 32.05 kg/m²     I & O - 24hr: In: 1080 [P.O.:540; I.V.:440]  · Out: -476 [Urine:124]  § General Appearance: alert and oriented to person, place and time, well-developed and well-nourished, in no acute distress  § Skin: warm and dry, no rash or erythema  § Head: normocephalic and atraumatic  § Eyes: pupils equal, round, and reactive to light, extraocular eye movements intact, conjunctivae normal  § Neck: neck supple and non tender without mass, no thyromegaly or thyroid nodules, no cervical lymphadenopathy   § Pulmonary/Chest: rhonchi present- bilaterally greatly improved from yesterday  § Cardiovascular: normal S1 and S2, no gallops, intact distal pulses and no carotid bruits  § Abdomen: soft, mild diffuse tenderness, bowel sounds normal, no masses, no organomegaly and no abdominal bruits  § Extremities: no cyanosis, no clubbing and no edema  § Musculoskeletal: normal range of motion, no joint swelling, deformity or tenderness  § Neurologic: speech normal, muscle strength normal and no tremor     Pertinent New Labs & Imaging Studies     CBC:   Lab Results   Component Value Date    WBC 21.8 12/05/2018    RBC 3.00 12/05/2018    HGB 9.1

## 2018-12-05 NOTE — PROGRESS NOTES
few creps; expiratory ronchi. CARDIOVASCULAR:   Heart Inspection shows no noted pulsations  Heart Palpation: no heaves or thrills, PMI in 5th ISC near left midclavicular line   Heart Ausculation -Normal S1 and S2, IRRR, no murmur, s3, s4 or rub noted. PV: no extremity edema. No varicosities. Pedal pulses palpable, no clubbing or cyanosis   ABDOMEN: Soft, non-tender to light palpation. Bowel sounds present. No palpable masses no hepatosplenomegaly or splenomegaly; no abdominal bruit / pulsation  MS: Good muscle strength and tone. Not atrophy or abnormal movements. : deferred. SKIN: Warm and dry no statis dermatitis or ulcers. NEURO / PSYCH: Oriented to person, place and time. Speech clear and appropriate. Follows all commands. Pleasant affect.        Monitor: AF      Lab Review       Recent Labs      12/03/18   1108  12/04/18   0455  12/05/18   0440   WBC  22.8*  25.5*  21.8*   HGB  11.5*  9.7*  9.1*   HCT  34.7*  29.3*  28.6*   PLT  164  128*  106*       Recent Labs      12/03/18   1108  12/04/18   0455  12/05/18   0440   NA  129*  130*  133   K  3.6  3.5  3.4*   CL  91*  93*  92*   CO2  22  23  23   PHOS   --   5.4*  5.1*   BUN  95*  86*  74*   CREATININE  7.9*  7.4*  6.8*       Recent Labs      12/05/18   0440   AST  6   ALT  10   ALKPHOS  61       Recent Labs      12/03/18   1852  12/04/18   0033  12/04/18   0440  12/04/18   1200  12/04/18   1840   CKTOTAL  59  56  19*  40  33   CKMB  2.8  2.9   --    --    --        Last 3 Troponin:  Lab Results   Component Value Date    TROPONINI 0.07 12/04/2018    TROPONINI 0.08 12/03/2018    TROPONINI 0.10 12/03/2018         Recent Labs      12/03/18   1108   INR  1.1       Recent Labs      12/03/18   1108   PROBNP  46,375*     ECHO, 12/4/18:   Ejection fraction is visually estimated at 55%.   No regional wall motion abnormalities seen.   Moderate left ventricular concentric hypertrophy noted.   Normal right ventricle structure and function.   Left atrial volume

## 2018-12-06 NOTE — CONSULTS
BILITOT 0.4 12/06/2018    ALKPHOS 67 12/06/2018    AST 7 12/06/2018    ALT 10 12/06/2018       Lab Results   Component Value Date    PROTIME 12.9 12/03/2018    INR 1.1 12/03/2018       Lab Results   Component Value Date    TSH 0.490 12/04/2018       Lab Results   Component Value Date    COLORU Yellow 12/03/2018    PHUR 5.5 12/03/2018    WBCUA 0-1 12/03/2018    RBCUA 2-5 12/03/2018    BACTERIA FEW 12/03/2018    CLARITYU CLOUDY 12/03/2018    SPECGRAV 1.020 12/03/2018    LEUKOCYTESUR Negative 12/03/2018    UROBILINOGEN 0.2 12/03/2018    BILIRUBINUR Negative 12/03/2018    BLOODU MODERATE 12/03/2018    GLUCOSEU 250 12/03/2018    AMORPHOUS FEW 12/03/2018       No results found for: LRV9BWE, BEART, B2SXLKPC, PHART, THGBART, PRR0VND, PO2ART, MSC2KZP  Radiology:  XR CHEST PORTABLE   Final Result   Airspace disease in the right lung compatible with pneumonia with   slight improvement since the prior chest radiograph. Opacities in the left lower lung may represent a developing infiltrate   or pulmonary edema. XR CHEST PORTABLE   Final Result      Opacification of the right upper lobe slightly worse than the day   before. US ABDOMEN LIMITED   Final Result   Moderate ascites   Right renal cyst                              XR CHEST PORTABLE   Final Result   Diffuse infiltrative changes involving the entire lung,   particularly the right upper lobe without significant change since the   last study obtained the day  before. XR CHEST PORTABLE   Final Result       Infiltrative changes involving the right lung while the left lung is   relatively clear. Pleural parenchymal scarring at the apices more so on the right was   present  before. XR CHEST PORTABLE   Final Result   1. Placement of central venous line through the right internal jugular   vein. No pneumothorax on the right or on the left. 2. Please review the report of CT chest of December 20, 2018.       CT Chest WO

## 2018-12-06 NOTE — PLAN OF CARE
Problem: Risk for Impaired Skin Integrity  Goal: Tissue integrity - skin and mucous membranes  Structural intactness and normal physiological function of skin and  mucous membranes.    Outcome: Met This Shift      Problem: Falls - Risk of:  Goal: Will remain free from falls  Will remain free from falls   Outcome: Met This Shift      Problem: Pain:  Goal: Pain level will decrease  Pain level will decrease   Outcome: Met This Shift      Problem: Gas Exchange - Impaired:  Goal: Levels of oxygenation will improve  Levels of oxygenation will improve   Outcome: Met This Shift

## 2018-12-06 NOTE — PROGRESS NOTES
retractions. LUNGS: diminished AE b/l; no creps; diffuse ronchi. Coarse breathe sounds. CARDIOVASCULAR:   Heart Inspection shows no noted pulsations  Heart Palpation: no heaves or thrills, PMI in 5th ISC near left midclavicular line   Heart Ausculation -Normal S1 and S2, IRRR, no murmur, s3, s4 or rub noted. PV: no extremity edema. No varicosities. Pedal pulses palpable, no clubbing or cyanosis   ABDOMEN: Soft, non-tender to light palpation. Bowel sounds present. No palpable masses no hepatosplenomegaly or splenomegaly; no abdominal bruit / pulsation  MS: Good muscle strength and tone. Not atrophy or abnormal movements. : deferred. SKIN: Warm and dry no statis dermatitis or ulcers. NEURO / PSYCH: Oriented to person, place and time. Speech clear and appropriate. Follows all commands. Pleasant affect.        Monitor: AF      Lab Review       Recent Labs      12/04/18   0455  12/05/18   0440  12/06/18   0440   WBC  25.5*  21.8*  19.5*   HGB  9.7*  9.1*  8.9*   HCT  29.3*  28.6*  27.5*   PLT  128*  106*  91*       Recent Labs      12/04/18   0455  12/05/18   0440  12/06/18   0440   NA  130*  133  128*   K  3.5  3.4*  3.0*   CL  93*  92*  87*   CO2  23  23  24   PHOS  5.4*  5.1*  4.5   BUN  86*  74*  71*   CREATININE  7.4*  6.8*  6.5*       Recent Labs      12/06/18   0440   AST  7   ALT  10   ALKPHOS  67       Recent Labs      12/03/18   1852  12/04/18   0033  12/04/18   0440  12/04/18   1200  12/04/18   1840   CKTOTAL  59  56  19*  40  33   CKMB  2.8  2.9   --    --    --        Last 3 Troponin:  Lab Results   Component Value Date    TROPONINI 0.07 12/04/2018    TROPONINI 0.08 12/03/2018    TROPONINI 0.10 12/03/2018         Recent Labs      12/03/18   1108   INR  1.1       Recent Labs      12/03/18   1108   PROBNP  46,375*       ECHO, 12/4/18:   Ejection fraction is visually estimated at 55%.   No regional wall motion abnormalities seen.   Moderate left ventricular concentric hypertrophy noted.  Khloe Santos

## 2018-12-06 NOTE — PROGRESS NOTES
Physical Therapy    Daily treatment note     Name: [place] Soto  : 10/20/1932  MRN: [id number]    Date of Service: 2018    Evaluating PT:  Clark Estrada PT, DPT [id number]    Room #:  3765/3422-H  Diagnosis:  Sepsis   PMHx:  Anemia, aortic regurgitation, asthma, bronchitis, cervical spinal cord compression, DM, enlarged left and right atrium, HD, HLD, HTN, kidney disorder (born with 1 kidney), L ventricular hypertrophy, mild aortic stenosis, mild mitral and tricuspid regurgitation, pulmonary HTN, TB   Precautions:  Falls, O2, [place]  Equipment Needs:  [employer]    Pt lives with wife in a 2 story home with 2 stairs to enter and 1 rail(s). There is a full flight of stairs to bedroom and bathroom but patient has a stair lift. Pt ambulated with [facility] or [facility] and required assist for ADLs. Per wife, [employer] pt uses at home is hers from an old surgery and is not in good condition. Pt will require his own [employer] at discharge. Initial Evaluation  Date: 18 Treatment  18 Short Term/ Long Term   Goals   AM-PAC 6 Clicks 93/53 68/04    Was pt agreeable to Eval/treatment? Yes  Yes    Does pt have pain? Pt c/o epigastric pain  No c/o pain     Bed Mobility  Rolling: Mod A  Supine to sit: Max A  Sit to supine: NT  Scooting: Mod A to EOB Rolling: Mod A  Supine to sit: Max A  Sit to supine: NT  Scooting: Mod A to EOB SBA   Transfers Sit to stand: Mod A from bed;  Max A from chair   Stand to sit: Mod A  Stand pivot: Mod A with [employer]  Sit to stand: Mod A   Stand to sit: Mod A  Stand pivot:  Mod A with [employer]  Min A with AAD   Ambulation    15 feet x2 with [employer] Mod A Few steps to bedside chair with [employer] Mod A >50 feet with AAD Min A   Stair negotiation: ascended and descended  NT NT >2 steps with 2 rail Min A   ROM BUE:  Per OT  BLE:  WFL See eval     Strength BUE:  Per OT  BLE:  Grossly 4+/5  See eval     Balance Sitting EOB:  SBA  Dynamic Standing:  Min A with [employer]  Sitting EOB:  SBA  Dynamic Standing:  Min A with [employer]  Sitting EOB: Supervision   Dynamic Standing:  Min A with AAD      Pt is A & O x 4  RASS:  0  CAM-ICU:  Negative   Sensation:  Pt denies numbness and tingling to extremities  Edema:  Unremarkable     Vitals:  Blood Pressure at rest 114/69 Blood Pressure post session 105/70   Heart Rate at rest 89 bpm Heart Rate post session 89 bpm   SPO2 at rest 97% on 3.5 L  SPO2 post session 94% on 3.5 L     Functional Status Score-Intensive Care Unit (FSS-ICU)   Rolling 3/7   Supine to sit transfer 2/7   Unsupported sitting  5/7   Sit to stand transfers 2/7   Ambulation 1/7   Total  13/35     Patient education  Pt educated on safety during functional mobility, hand placement during bed mobility/transfers, [employer] approximation/negotiation, gait training, standing posture. Patient response to education:   Pt verbalized understanding Pt demonstrated skill Pt requires further education in this area   Yes  With cues  Yes // Reinforce      Comments:  Pt received supine and agreeable to PT treatment. Pt cleared for participation by RN prior to session. Pt required assist of trunk and BLE to EOB. No c/o dizziness or lt headedness. Vitals monitored closely during session and remained WNL. Pt performed STS from bed and require cues for hand placement. MD in to speak with pt so he was returned to sitting EOB. Pt performed second STS and ambulation to bedside chair. Pt with small steps, short step length, and cues for [employer] negotiation. Pt require cues to increase step length. Pt sat down in bedside chair and had BM. Pt performed third STS while blanket and pads changed and being assisted with hygiene. Overall standing tolerance 2-3 minutes each x3 stands. Pt left up in bedside chair with call button in reach and needs met. PLAN  Pt is making fair progress towards established goals. Continue PT POC.        Time in  0755  Time out  [facility], PT, DPT  [id number]

## 2018-12-06 NOTE — PROGRESS NOTES
Department of Internal Medicine  Nephrology Progress Note      Events reviewed. SUBJECTIVE:  We are following Zeferino Hardin for ESRD on peritoneal dialysis. He reports feeling better.    He is sitting at bedside and she is eating breakfast, states he slpet well last night with no acute events     Scheduled Meds:   potassium chloride  40 mEq Oral Once    insulin lispro  0-12 Units Subcutaneous TID WC    insulin lispro  0-6 Units Subcutaneous Nightly    digoxin  62.5 mcg Oral Every Other Day    pantoprazole  40 mg Oral QAM AC    piperacillin-tazobactam  3.375 g Intravenous Q12H    darbepoetin hayde-polysorbate  40 mcg Subcutaneous Weekly    heparin (porcine)  5,000 Units Subcutaneous TID    ipratropium-albuterol  1 ampule Inhalation Q4H WA    budesonide  500 mcg Nebulization BID    formoterol  20 mcg Nebulization BID    sodium chloride flush  10 mL Intravenous 2 times per day     Continuous Infusions:   diltiazem (CARDIZEM) 125 mg in dextrose 5% 125 mL infusion 15 mg/hr (12/06/18 0510)    dextrose       PRN Meds:.sodium chloride flush, magnesium hydroxide, ondansetron, glucose, dextrose, glucagon (rDNA), dextrose      Physical Exam:    VITALS:  /73   Pulse 87   Temp 98.6 °F (37 °C) (Temporal)   Resp 26   Ht 5' 5\" (1.651 m)   Wt 190 lb 8 oz (86.4 kg)   SpO2 98%   BMI 31.70 kg/m²   24HR INTAKE/OUTPUT:    Intake/Output Summary (Last 24 hours) at 12/06/18 8625  Last data filed at 12/06/18 0700   Gross per 24 hour   Intake             1348 ml   Output              985 ml   Net              363 ml       Access:  Peripherals   Constitutional: awake, alert and oriented X3    HEENT:  EMOI, PERRLA, neck is supple   NECK: elevated jvd   Respiratory:  Bilateral crackles   Cardiovascular/Edema:  RRR, no murmurs, has elevated JVD and galloping    Gastrointestinal:  Patient with dialysis access in his left LQ and has distended abdomen    Neurologic:  He is awake and alert there is no focal weaknesses

## 2018-12-06 NOTE — FLOWSHEET NOTE
12/06/18 0700   Vitals   /73  (Simultaneous filing. User may not have seen previous data.)   Pulse 87  (Simultaneous filing. User may not have seen previous data.)   Resp 26  (Simultaneous filing. User may not have seen previous data.)   SpO2 98 %  (Simultaneous filing. User may not have seen previous data.)   Peritoneal Dialysis Catheter Left lower abdomen   Placement Date/Time: 07/07/16 1005   Timeout: Patient  Inserted by: Dr Reji Kebede  Catheter Location: Left lower abdomen   Status Deaccessed   Site Condition No Complications   Dressing Status Clean;Dry; Intact   Dressing Occlusive   Peritoneal Dialysis (CAPD manual)   Effluent Appearance Fibrin;Yellow   Cycler   Ultrafiltration (UF) (mL) 201 mL   CCPD deaccessed using aseptic technique, net UF 201ml, yellow effluent with moderate amount of fibrin noted, will notify MD

## 2018-12-06 NOTE — PROGRESS NOTES
[facility]  Department of Internal Medicine  Internal Medicine Residency Program  Resident MICU Progress  Note      Patient:  Rosa Ga 80 y.o. male MRN: [id number]     Date of Service: 12/6/2018    Allergy: Fenofibrate and Lipitor  CC: F/u: SOB  Subjective       Patient seen and examined at bedside in the morning, and during rounds. Patient refers no change from yesterday. Had BM yesterday. Blood culture grew E coli, will be started on Invanz today for coverage of anaerobes. ROS: denies headache, chest pain, nausea, vomiting, abdominal pain diarrhea, constipation, blood in stool or urine. Objective   Physical Exam:  · Vitals: /73   Pulse 87   Temp 98.6 °F (37 °C) (Temporal)   Resp 26   Ht 5' 5\" (1.651 m)   Wt 190 lb 8 oz (86.4 kg)   SpO2 98%   BMI 31.70 kg/m²     I & O - 24hr: In: 548 [P.O.:120;  I.V.:228]  · Out: 941 [Urine:72]  § General Appearance: alert and oriented to person, place and time, well-developed and well-nourished, in no acute distress  § Skin: warm and dry, no rash or erythema  § Head: normocephalic and atraumatic  § Eyes: pupils equal, round, and reactive to light, extraocular eye movements intact, conjunctivae normal  § Neck: neck supple and non tender without mass, no thyromegaly or thyroid nodules, no cervical lymphadenopathy   § Pulmonary/Chest: rhonchi present- bilaterally greatly improved from yesterday  § Cardiovascular: normal S1 and S2, no gallops, intact distal pulses and no carotid bruits  § Abdomen: soft, mild diffuse tenderness, bowel sounds normal, no masses, no organomegaly and no abdominal bruits  § Extremities: no cyanosis, no clubbing and no edema  § Musculoskeletal: normal range of motion, no joint swelling, deformity or tenderness  § Neurologic: speech normal, muscle strength normal and no tremor     Pertinent New Labs & Imaging Studies     CBC:   Lab Results   Component Value Date    WBC 19.5 12/06/2018    RBC 2.90 12/06/2018    HGB 8.9 and tiny pleural effusions in lung bases which may be due to pneumonia or edema. Examination is limited due to the lack of intravenous contrast. Grossly, liver is heterogeneously decreased in attenuation which may be  due to fatty infiltration. There is a moderate amount of high-density ascites with fluid surrounding the liver, paracolic gutters, mesentery and pelvis. A large cystic collection is identified in the expected location of gallbladder which may be inflamed gallbladder. If cholecystitis is a consideration, consider ultrasonography or hepatobiliary scan. Spleen appears normal. There is slight prominence of the uncinate process of pancreas. Small lymph nodes are identified in the peripancreatic area. Adrenals appear grossly unremarkable. Left kidney is atrophic. There is a 4 x 4.6 cm hyperdense lesion in the right kidney with additional small cystic lesions. Both hemorrhagic or proteinaceous cyst versus solid mass involving the kidneys considered. Degenerative changes are identified in the lumbar spine Pelvis. Bladder is contracted with  wall thickening and Salazar catheter. High-density ascites is present in the pelvis. Colon is collapsed. Appendix is noted. There is a small left inguinal hernia with herniation of ascites fluid into the hernia sac. Limited noncontrast study. There is heterogeneous appearance of liver concerning for liver disease with a moderate amount of high-density ascites. Hemorrhagic or malignant ascites has to be excluded. Apparently thickened and distended gallbladder is also identified. If cholecystitis is considered, consider ultrasonography and or hepatobiliary scan. 4 x 4.6 cm hyperdense lesion in the right kidney which may represent proteinaceous or hemorrhagic cyst or solid mass. There is also slight prominence of the uncinate process of the pancreas. Surveillance is recommended. Infiltrates and atelectasis in the right lung base.      Ct Chest Wo Contrast    Result Date: 12/3/2018  Patient MRN:  [id number] : 10/20/1932 Age: 80 years Gender: Male Order Date:  12/3/2018 11:15 AM EXAM: CT CHEST WO CONTRAST number of images 310 Technique: Low-dose CT  acquisition technique included one of following options; 1 . Automated exposure control, 2. Adjustment of MA and or KV according to patient's size or 3. Use of iterative reconstruction. INDICATION:  right lung infilrate vs abscess vs mass  COMPARISON: None FINDINGS: There is borderline cardiac size. 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 The pulmonary artery is prominent. There is coronary artery calcifications. The trachea and major bronchi are patent. There is patchy and masslike infiltrative density in the right upper lobe and right middle lobe which may represent complicated pneumonia. Underlying malignancy is difficult to exclude. Patchy nodular infiltrative densities are identified in the right lung base with  nodular densities measuring up to 1.5 x 0.7 cm. left apical pleural-based density is identified probably scarring which is unchanged from previous CT of the cervical spine dated 2015. Tiny right pleural effusion is present. There is heterogeneous appearance of liver with  ascites and distended gallbladder. Please see the CT of the abdomen and pelvis on the same day. Patchy and masslike infiltrative density in the right upper lobe and right middle lobe which may represent, complicated pneumonia or malignancy and surveillance is recommended to see complete clearing. Patchy and nodular infiltrative densities in the right lung base is also identified. 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 Surveillance recommended. Please see the CT report on the abdomen and pelvis on the same day.     Xr Chest Portable    Result Date: 2018  Patient MRN: [id number] : 10/20/1932 Age:  80 years Gender: Male Order Date: 2018 6:00 AM Exam: XR CHEST PORTABLE Number of Images: 1

## 2018-12-07 NOTE — PLAN OF CARE
Problem: Falls - Risk of:  Goal: Will remain free from falls  Will remain free from falls   Outcome: Met This Shift      Problem: Airway Clearance - Ineffective:  Goal: Ability to maintain a clear airway will improve  Ability to maintain a clear airway will improve   Outcome: Met This Shift      Problem: Gas Exchange - Impaired:  Goal: Levels of oxygenation will improve  Levels of oxygenation will improve   Outcome: Met This Shift      Comments:     Careplan reviewed with patient and family as available.

## 2018-12-07 NOTE — PROGRESS NOTES
HGB 9.2 2018    HCT 29.0 2018    MCV 95.7 2018    MCH 30.4 2018    MCHC 31.7 2018    RDW 14.1 2018    PLT 97 2018    MPV 9.8 2018     BMP:    Lab Results   Component Value Date     2018    K 3.2 2018    CL 90 2018    CO2 26 2018    BUN 66 2018    LABALBU 2.6 2018    LABALBU 3.7 2012    CREATININE 6.2 2018    CALCIUM 8.4 2018    GFRAA 10 2018    LABGLOM 9 2018    GLUCOSE 253 2018    GLUCOSE 104 2012        Notable Cultures:       Culture  Date sent  Result    Nasal      Sputum      Urine Strep pneumonia Ag  18 neg     Urine Legionella Ag   08 neg    Blood  18 GNR     Urine          Antibiotic  Days  Day started     vancomycin 1         Zosyn 3  18   tobramycin once 18          Oxygen:   Nasal cannula L/min  3Lt/min   Face mask %     Reservoirs mask %       ABG:  No results found for: PHART, FVC1LTH, PO2ART, N3ATODIM, UTU8CEK, BEART      Lines:  Site  Day  Date inserted     TLC    R IJ  3  12      PICC              Arterial line              Peripheral line                           DVT Prophylaxis      Heparin    X     Enoxaparin        PCDs X     Imaging studies:  Ct Abdomen Pelvis Wo Contrast Additional Contrast? None    Result Date: 12/3/2018  Patient MRN:  [id number] : 10/20/1932 Age: 80 years Gender: Male Order Date:  12/3/2018 11:45 AM EXAM: CT ABDOMEN PELVIS WO CONTRAST number of images 413 Technique: Low-dose CT  acquisition technique included one of following options; 1 . Automated exposure control, 2. Adjustment of MA and or KV according to patient's size or 3. Use of iterative reconstruction. INDICATION:  distended abdomen, abdominal pain  COMPARISON: Previous CT scan 2016 FINDINGS: The lung bases demonstrate cardiomegaly with coronary artery calcification.  There is infiltrates in the right middle lobe and atelectasis and tiny

## 2018-12-07 NOTE — PROGRESS NOTES
Reason for follow up:  Hypoxic respiratory failure; HFpEF; ESRD; AF     Subjective:  SOB persists. No CP. Tired. Objective:    No distress. No events overnight. Scheduled Meds:   potassium chloride  20 mEq Intravenous Q2H    diltiazem  30 mg Oral 4 times per day    warfarin  2.5 mg Oral Once    insulin lispro  0-12 Units Subcutaneous TID WC    insulin lispro  0-6 Units Subcutaneous Nightly    bumetanide  2 mg Intravenous BID    metolazone  2.5 mg Oral Daily    ertapenem (INVANZ) IVPB  500 mg Intravenous Q24H    warfarin (COUMADIN) daily dosing (placeholder)   Other RX Placeholder    pantoprazole  40 mg Oral QAM AC    darbepoetin hayde-polysorbate  40 mcg Subcutaneous Weekly    heparin (porcine)  5,000 Units Subcutaneous TID    ipratropium-albuterol  1 ampule Inhalation Q4H WA    budesonide  500 mcg Nebulization BID    formoterol  20 mcg Nebulization BID    sodium chloride flush  10 mL Intravenous 2 times per day       Continuous Infusions:   dextrose             Intake/Output Summary (Last 24 hours) at 12/07/18 0948  Last data filed at 12/07/18 0800   Gross per 24 hour   Intake              696 ml   Output              916 ml   Net             -220 ml       Patient Vitals for the past 96 hrs (Last 3 readings):   Weight   12/07/18 0600 191 lb 8 oz (86.9 kg)   12/06/18 0600 190 lb 8 oz (86.4 kg)   12/05/18 0500 192 lb 9.6 oz (87.4 kg)          PE:   /67   Pulse 99   Temp 98.3 °F (36.8 °C) (Temporal)   Resp 16   Ht 5' 5\" (1.651 m)   Wt 191 lb 8 oz (86.9 kg)   SpO2 100%   BMI 31.87 kg/m²   CONST: In general, this is a well developed, elderly male who appears of stated age. Awake, alert, cooperative, no apparent distress. Throat: Oral mucosa pink and moist.  HEENT: Head- normocephalic, atraumatic; Eyes:conjunctivae pink, no noted xanthomas. Neck: no stridor, no noted enlargement of the thyroid,symmetric, no tenderness, no jugular venous distention.  No

## 2018-12-07 NOTE — PROGRESS NOTES
Contrast    Result Date: 12/3/2018  Patient MRN:  [id number] : 10/20/1932 Age: 80 years Gender: Male Order Date:  12/3/2018 11:15 AM EXAM: CT CHEST WO CONTRAST number of images 310 Technique: Low-dose CT  acquisition technique included one of following options; 1 . Automated exposure control, 2. Adjustment of MA and or KV according to patient's size or 3. Use of iterative reconstruction. INDICATION:  right lung infilrate vs abscess vs mass  COMPARISON: None FINDINGS: There is borderline cardiac size. 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 The pulmonary artery is prominent. There is coronary artery calcifications. The trachea and major bronchi are patent. There is patchy and masslike infiltrative density in the right upper lobe and right middle lobe which may represent complicated pneumonia. Underlying malignancy is difficult to exclude. Patchy nodular infiltrative densities are identified in the right lung base with  nodular densities measuring up to 1.5 x 0.7 cm. left apical pleural-based density is identified probably scarring which is unchanged from previous CT of the cervical spine dated 2015. Tiny right pleural effusion is present. There is heterogeneous appearance of liver with  ascites and distended gallbladder. Please see the CT of the abdomen and pelvis on the same day. Patchy and masslike infiltrative density in the right upper lobe and right middle lobe which may represent, complicated pneumonia or malignancy and surveillance is recommended to see complete clearing. Patchy and nodular infiltrative densities in the right lung base is also identified. 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 Surveillance recommended. Please see the CT report on the abdomen and pelvis on the same day.     Xr Chest Portable    Result Date: 2018  Patient MRN: [id number] : 10/20/1932 Age:  80 years Gender: Male Order Date: 2018 6:00 AM Exam: XR CHEST 6.9%   Monitor BG  medium dose SS     Septic shock  Blood pressure stable today  2/2 pneumonia, GNR bacteremia  CXR opacities on upper right lobe   Wbc 19.5  Cortisol 28.62  Peritoneal fluid cell count 30 with 33% neut r/o SBP  Sputum, urine, blood culture>>positive for E coli  Stop zosyn, start Invanz   Received 1 dose of tobramycin  ID following    Anemia  Normocytic   Anemia of chronic disease  Give aranesp 40 mcg weekly  Baseline 9     DVT prophylaxis/ GI prophylaxis: PCD/ Heparin SC  Disposition: Transfer out    Rosa Ga was seen, examined and discussed with the multi-disciplinary ICU team during rounds. A addendum note may be separate to the residents note. If not then, I have personally seen and examined the patient and the key elements of the encounter were performed by me (> 85 % time). The medications & laboratory data was discussed and adjusted where necessary. The radiographic images were reviewed either as a group or with radiologist.  Any changes are document or changed if felt dis-concordant with the exam or history. The above findings were corroborated, plans confirmed and changes made if needed. Family was updated at the bedside as available. Key issues of the case were discussed among consultants. Critical Care time is documented if appropriate.           Tio Lacey DO, MPH  Professor of Medicine

## 2018-12-08 NOTE — PROGRESS NOTES
RBC 3.09 2018    HGB 9.4 2018    HCT 30.3 2018    MCV 98.1 2018    MCH 30.4 2018    MCHC 31.0 2018    RDW 14.2 2018    PLT 99 2018    MPV 10.1 2018     BMP:    Lab Results   Component Value Date     2018    K 3.6 2018    CL 94 2018    CO2 26 2018    BUN 65 2018    LABALBU 2.5 2018    LABALBU 3.7 2012    CREATININE 6.4 2018    CALCIUM 8.4 2018    GFRAA 10 2018    LABGLOM 8 2018    GLUCOSE 265 2018    GLUCOSE 104 2012        Notable Cultures:       Culture  Date sent  Result    Nasal      Sputum      Urine Strep pneumonia Ag  18 neg     Urine Legionella Ag   08 neg    Blood  18 GNR     Urine          Antibiotic  Days  Day started     vancomycin 1         Zosyn 3  18   tobramycin once 18          Oxygen:   Nasal cannula L/min  3Lt/min   Face mask %     Reservoirs mask %       ABG:  No results found for: PHART, NLE9XDU, PO2ART, M0BPAHFQ, ODS8RDE, BEART      Lines:  Site  Day  Date inserted     TLC    R IJ  3  12      PICC              Arterial line              Peripheral line                           DVT Prophylaxis      Heparin    X     Enoxaparin        PCDs X     Imaging studies:  Ct Abdomen Pelvis Wo Contrast Additional Contrast? None    Result Date: 12/3/2018  Patient MRN:  [id number] : 10/20/1932 Age: 80 years Gender: Male Order Date:  12/3/2018 11:45 AM EXAM: CT ABDOMEN PELVIS WO CONTRAST number of images 413 Technique: Low-dose CT  acquisition technique included one of following options; 1 . Automated exposure control, 2. Adjustment of MA and or KV according to patient's size or 3. Use of iterative reconstruction. INDICATION:  distended abdomen, abdominal pain  COMPARISON: Previous CT scan 2016 FINDINGS: The lung bases demonstrate cardiomegaly with coronary artery calcification.  There is infiltrates in the right middle lobe PORTABLE Number of Images: 1 view Indication:   SOB SOB Comparison: December 3, 2018. Findings: The heart is unremarkable. There is still noted patchy changes involving the right lung with minimal if any improvement. Central line is seen with the tip in the superior vena cava. The left lung shows heavy markings with pleural parenchymal scarring at the apex. There is tortuosity of the thoracic aorta. No pleural effusion. Chronic rotator cuff cuff tear seen on the left. The aorta is unremarkable. Diffuse infiltrative changes involving the entire lung, particularly the right upper lobe without significant change since the last study obtained the day  before. Xr Chest Portable    Result Date: 12/3/2018  Patient MRN:  [id number] : 10/20/1932 Age: 80 years Gender: Male Order Date:  12/3/2018 9:00 PM TECHNIQUE/NUMBER OF IMAGES/COMPARISON/CLINICAL HISTORY: Chest AP 1 image one view Comparison April 15, 2016, 2016, 2018. History after central venous line placement FINDINGS: Right internal jugular central venous catheter tip in SVC right atrial junction. Extensive process seen in the right lung with loss of volume . There is a apical pleural thickening which has been progressive since previous studies . Cannot exclude a malignancy in the right lung although infiltrate on the basis of pneumonia will be part of the differential diagnosis. There is also progressive thickening of the left apical pleural cap. See report of CT scan of the chest performed same day . No pneumothorax on the right on the left     1. Placement of central venous line through the right internal jugular vein. No pneumothorax on the right or on the left. 2. Please review the report of CT chest of 2018.     Xr Chest Portable    Result Date: 12/3/2018  Patient MRN: [id number] : 10/20/1932 Age:  80 years Gender: Male Order Date: 12/3/2018 11:00 AM Exam: XR CHEST PORTABLE Number of Images: 1 view Indication:   dyspnea,

## 2018-12-08 NOTE — PROGRESS NOTES
Gastrointestinal:  Patient with dialysis access in his left LQ and has distended abdomen    Neurologic:  He is awake and alert there is no focal weaknesses   Other: has +1 edema involving bilateral thighs      DATA:    CBC:   Lab Results   Component Value Date    WBC 10.3 12/08/2018    RBC 3.09 12/08/2018    HGB 9.4 12/08/2018    HCT 30.3 12/08/2018    MCV 98.1 12/08/2018    MCH 30.4 12/08/2018    MCHC 31.0 12/08/2018    RDW 14.2 12/08/2018    PLT 99 12/08/2018    MPV 10.1 12/08/2018     CMP:    Lab Results   Component Value Date     12/08/2018    K 3.6 12/08/2018    CL 94 12/08/2018    CO2 26 12/08/2018    BUN 65 12/08/2018    CREATININE 6.4 12/08/2018    GFRAA 10 12/08/2018    LABGLOM 8 12/08/2018    GLUCOSE 265 12/08/2018    GLUCOSE 104 05/16/2012    PROT 5.1 12/08/2018    LABALBU 2.5 12/08/2018    LABALBU 3.7 05/16/2012    CALCIUM 8.4 12/08/2018    BILITOT 0.3 12/08/2018    ALKPHOS 73 12/08/2018    AST 5 12/08/2018    ALT 11 12/08/2018     Magnesium:    Lab Results   Component Value Date    MG 1.8 12/08/2018     Phosphorus:    Lab Results   Component Value Date    PHOS 4.1 12/08/2018       Radiology Review:     CXR 12/06/18:    Airspace disease in the right lung compatible with pneumonia with   slight improvement since the prior chest radiograph. Opacities in the left lower lung may represent a developing infiltrate   or pulmonary edema. BRIEF SUMMARY OF INITIAL CONSULT:     Briefly Lamont Bullock is 80 y.o. male with history of ESRD on CCPD, essential hypertension, hyperlipidemia, obesity, congenital absence of one kidney,  pulmonary hypertension, who was admitted on 12/3/2018 for hypoxic respiratory failure and septic shock. His symptoms started yesterday night  With right side chest pain and difficultly breathing, he describes some cough productive of yellow sputum. He contacted his PCP this morning who instructed to present to the ED.  In the emergency room she was found to be hypotensive and with a large right lung infiltrate consistent with pneumonia. Laboratory findings were significant for Na: 129 mmol/L, chloride 91 mmol/L, creatinine 7.9 mg /dl lactic acid of 3.9. Reasons for this consult. IMPRESSION/RECOMMENDATIONS:      1. ESRD he is on peritoneal dialysis he is tolerating with no problems   2. Sepsis 2/2 to gram negative bacteremia E-Coli. He is on Invanz. ID is following   3. Acute hypoxic respiratory failure on intermittent BiPAP   4. Hypokalemia, 2/2 to K removal by PD  5. Type II DM on insulin sliding scale   6. MBD of CKD: binders are on hold, normal phosphors levels   7. Normocytic anemia due to CKD hemoglobin is stable, on Aranesp   8. Atrial fibrillation currently on Cardizem and Coumadin. Cardiology following   9.  Nutrition: renal diet with fluid restriction 1 L      PLAN:    · Continue ccpd / pd fluid analysis and culture was negative for peritonitis   · Continue diuretics as still some residual function   · Continue low dose k support   · Good iron stores/ continue epo support  ·  follow labs closely             Electronically signed by Lisa Graham MD on 12/8/2018 at 8:44 AM

## 2018-12-08 NOTE — PROGRESS NOTES
Admit Date: 12/3/2018      Subjective:     Patient: no acute issues reported overnight  Medication side effects: none    Scheduled Meds:   potassium chloride  20 mEq Oral Once    magnesium sulfate  1 g Intravenous Once    diltiazem  60 mg Oral 4 times per day    nystatin  5 mL Oral 4x Daily    levalbuterol  0.63 mg Nebulization Q4H While awake    insulin lispro  0-12 Units Subcutaneous TID WC    insulin lispro  0-6 Units Subcutaneous Nightly    bumetanide  2 mg Intravenous BID    metolazone  2.5 mg Oral Daily    ertapenem (INVANZ) IVPB  500 mg Intravenous Q24H    warfarin (COUMADIN) daily dosing (placeholder)   Other RX Placeholder    pantoprazole  40 mg Oral QAM AC    darbepoetin hayde-polysorbate  40 mcg Subcutaneous Weekly    ipratropium-albuterol  1 ampule Inhalation Q4H WA    budesonide  500 mcg Nebulization BID    formoterol  20 mcg Nebulization BID    sodium chloride flush  10 mL Intravenous 2 times per day     Continuous Infusions:   dextrose       PRN Meds:sodium chloride (Inhalant), sodium chloride flush, magnesium hydroxide, ondansetron, glucose, dextrose, glucagon (rDNA), dextrose    Objective:   I/O last 3 completed shifts: In: 100 [P.O.:100]  Out: 949 [Urine:68]  No intake/output data recorded.     BP (!) 120/50   Pulse 128   Temp 98.3 °F (36.8 °C) (Temporal)   Resp 22   Ht 5' 5\" (1.651 m)   Wt 190 lb 12.8 oz (86.5 kg)   SpO2 97%   BMI 31.75 kg/m²   General appearance: no distress  Skin:negative  Heent:negative  Lungs: diminished BS's bilaterally  Heart: regular rate and rhythm, S1, S2 normal, no murmur, click, rub or gallop  Abdomen: soft, non-tender; bowel sounds normal; no masses,  no organomegaly  Extremities:no edema  Neurologic: Grossly normal    Labs:  CBC with Differential:    Lab Results   Component Value Date    WBC 10.3 12/08/2018    RBC 3.09 12/08/2018    HGB 9.4 12/08/2018    HCT 30.3 12/08/2018    PLT 99 12/08/2018    MCV 98.1 12/08/2018    MCH 30.4 12/08/2018

## 2018-12-08 NOTE — PROGRESS NOTES
the thyroid,symmetric, no tenderness, no jugular venous distention. No carotid bruit noted. CHEST: Symmetrical and non-tender to palpation. No noted accessory muscle use or intercostal retractions. LUNGS: diminished AE b/l; right sided creps; few ronchi. CARDIOVASCULAR:   Heart Inspection shows no noted pulsations  Heart Palpation: no heaves or thrills, PMI in 5th ISC near left midclavicular line   Heart Ausculation -Normal S1 and S2, RRR, no murmur, s3, s4 or rub noted. PV: trace extremity edema. No varicosities. Pedal pulses palpable, no clubbing or cyanosis   ABDOMEN: Soft, non-tender to light palpation. Bowel sounds present. No palpable masses no hepatosplenomegaly or splenomegaly; no abdominal bruit / pulsation  MS: Good muscle strength and tone. Not atrophy or abnormal movements. : deferred. SKIN: Warm and dry no statis dermatitis or ulcers. NEURO / PSYCH: Oriented to person, place and time. Speech clear and appropriate. Follows all commands. Pleasant affect.        Monitor: AF,       Lab Review       Recent Labs      12/06/18   1627  12/07/18   0420  12/08/18   0520   WBC  18.3*  12.8*  10.3   HGB  9.3*  9.2*  9.4*   HCT  28.7*  29.0*  30.3*   PLT  93*  97*  99*       Recent Labs      12/06/18   0440  12/07/18   0420  12/07/18   1440  12/08/18   0520   NA  128*  132  133  135   K  3.0*  3.2*  3.9  3.6   CL  87*  90*  93*  94*   CO2  24  26  26  26   PHOS  4.5  4.1   --   4.1   BUN  71*  66*  72*  65*   CREATININE  6.5*  6.2*  6.6*  6.4*       Recent Labs      12/08/18   0520   AST  5   ALT  11   ALKPHOS  73           Last 3 Troponin:  Lab Results   Component Value Date    TROPONINI 0.07 12/04/2018    TROPONINI 0.08 12/03/2018    TROPONINI 0.10 12/03/2018         Recent Labs      12/06/18   1627  12/07/18   0420  12/08/18   0520   INR  1.1  1.1  1.1     ECHO, 12/4/18:   Ejection fraction is visually estimated at 55%.   No regional wall motion abnormalities seen.   Moderate left ventricular concentric hypertrophy noted.   Normal right ventricle structure and function.   Left atrial volume index of 46 ml per meters squared BSA.   Moderate aortic stenosis is present.   The aortic valve area is 1.2 cm2 with a maximum gradient of 38 mmHg and a   mean gradient of 20 mmHg.   Mild aortic regurgitation is noted.   Physiologic and/or trace mitral regurgitation is present.   Moderate tricuspid regurgitation.   Pulmonary hypertension is moderate. RVSP is 51 mmHg.     Assessment:  1. Presented with few day h/o worsening SOB with cough. No CP. CXR consistent with right lung infiltrate. Concern of PNA. On ATB. 2. Hypoxic respiratory failure: related to PNA. Mild CHF on exam. PD for fluid extraction. Minimal UOP: on Bumex IV. 3. AF: newly diagnosed. Probably related to his PNA. RVR secondary to respiratory insufficiency. [id number] score is 4: candidate for [address] [place] [address]. On Coumadin. 4. ESRD on PD. Minimal urine output. 5. Septic shock: Blood culture grew E coli. On Invanz  6. H/o HTN  7. Positive blood cultures. On ATB    Plan:  1. Continue Cardizem  2. Coumadin for [address] [place] [address]  3. ATB as per primary service  4. No cardiac work up planned. Dr. Silva Goss MD.  [facility].

## 2018-12-08 NOTE — PROGRESS NOTES
Nutrition Assessment    Type and Reason for Visit: Initial     Nutrition Summary: Pt at nutritional risk AEB <50% po intake since admit x 5 days. At risk for further compromise d/t metabolic demand of ESRD w/ PD. Continue magic cup BID w/ fluid restriction. Trial low volume ensure daily. Nutrition Recommendations: Consider lifting renal restriction K/Phos WNL to allow greater food choices w/ poor po intake. Noted elevated glucose- cannot provide diabetic ONS at this time d/t fluid restriction  Note pt receiving ~245 dextrose kcals/day from PD regimen     Malnutrition Assessment:  · Malnutrition Status: At risk for malnutrition  · Context: Acute illness or injury  · Findings of the 6 clinical characteristics of malnutrition (Minimum of 2 out of 6 clinical characteristics is required to make the diagnosis of moderate or severe Protein Calorie Malnutrition based on AND/ASPEN Guidelines):  1. Energy Intake-Less than 50% of estimated energy requirement for greater than or equal to 5 days,      2. Weight Loss-No significant weight loss  3. Fat Loss-No significant subcutaneous fat loss  4. Muscle Loss-No significant muscle mass loss  5. Fluid Accumulation-No significant fluid accumulation   6.  Strength-Not measured    Nutrition Risk Level:  Moderate    Nutrient Needs:  · Estimated Daily Total Kcal: 7321-4987 (MSJ REE 1408 x 1.3 SF)  · Estimated Daily Protein (g): 80-95  (1.3-1.5 g/kg)  · Estimated Daily Total Fluid (ml/day): 1000 ml per orders     Nutrition Diagnosis:   · Problem: Inadequate oral intake  · Etiology: related to Insufficient energy/nutrient consumption     Signs and symptoms:  as evidenced by Intake 0-25%    Objective Information:  · Nutrition-Focused Physical Findings: pt lethargic, wife at bedside, SOB PTA, +I/O's, trace/+1 edema, boggy B/L heels, weakness, active BS, abd distention, ESRD on nightly PD, hyperphosphatemia improved      · Wound Type: None     · Current Nutrition

## 2018-12-09 NOTE — PROGRESS NOTES
palpation. No noted accessory muscle use or intercostal retractions. LUNGS: diminished AE b/l; no creps; diffuse ronchi. CARDIOVASCULAR:   Heart Inspection shows no noted pulsations  Heart Palpation: no heaves or thrills, PMI in 5th ISC near left midclavicular line   Heart Ausculation -Normal S1 and S2, RRR, no murmur, s3, s4 or rub noted. PV: trace extremity edema. No varicosities. Pedal pulses palpable, no clubbing or cyanosis   ABDOMEN: Soft, non-tender to light palpation. Bowel sounds present. No palpable masses no hepatosplenomegaly or splenomegaly; no abdominal bruit / pulsation  MS: Good muscle strength and tone. Not atrophy or abnormal movements. : deferred. SKIN: Warm and dry no statis dermatitis or ulcers. NEURO / PSYCH: Drowsy.      Monitor: AF, CVR      Lab Review       Recent Labs      12/07/18   0420  12/08/18   0520  12/09/18   0515   WBC  12.8*  10.3  13.5*   HGB  9.2*  9.4*  9.4*   HCT  29.0*  30.3*  31.1*   PLT  97*  99*  107*       Recent Labs      12/07/18   0420  12/07/18   1440  12/08/18   0520  12/09/18   0515   NA  132  133  135  134   K  3.2*  3.9  3.6  4.0   CL  90*  93*  94*  95*   CO2  26  26  26  24   PHOS  4.1   --   4.1  4.5   BUN  66*  72*  65*  68*   CREATININE  6.2*  6.6*  6.4*  6.5*       Recent Labs      12/09/18   0515   AST  9   ALT  11   ALKPHOS  82           Recent Labs      12/07/18   0420  12/08/18   0520  12/09/18   0515   INR  1.1  1.1  1.2     ECHO, 12/4/18:   Ejection fraction is visually estimated at 55%.   No regional wall motion abnormalities seen.   Moderate left ventricular concentric hypertrophy noted.   Normal right ventricle structure and function.   Left atrial volume index of 46 ml per meters squared BSA.   Moderate aortic stenosis is present.   The aortic valve area is 1.2 cm2 with a maximum gradient of 38 mmHg and a   mean gradient of 20 mmHg.   Mild aortic regurgitation is noted.   Physiologic and/or trace mitral regurgitation is

## 2018-12-09 NOTE — PROGRESS NOTES
12/07/2018    MCHC 32.4 12/06/2018    MCHC 32.4 12/06/2018    MCHC 31.8 12/05/2018    RDW 14.2 12/08/2018    RDW 14.1 12/07/2018    RDW 13.9 12/06/2018    RDW 13.9 12/06/2018    RDW 14.0 12/05/2018    SEGSPCT 61 03/18/2014    SEGSPCT 55 12/09/2013    SEGSPCT 61 08/28/2013    SEGSPCT 53 03/15/2013    SEGSPCT 59 12/11/2012    METASPCT 1.8 12/04/2018    LYMPHOPCT 7.8 12/08/2018    LYMPHOPCT 13.9 12/07/2018    LYMPHOPCT 12.2 12/06/2018    LYMPHOPCT 5.2 12/06/2018    LYMPHOPCT 11.4 12/05/2018    MONOPCT 7.8 12/08/2018    MONOPCT 10.4 12/07/2018    MONOPCT 8.3 12/06/2018    MONOPCT 2.6 12/06/2018    MONOPCT 2.6 12/05/2018    MYELOPCT 0.9 12/08/2018    BASOPCT 0.9 12/08/2018    BASOPCT 0.2 12/07/2018    BASOPCT 0.3 12/06/2018    BASOPCT 0.2 12/06/2018    BASOPCT 0.2 12/05/2018    MONOSABS 0.82 12/08/2018    MONOSABS 1.28 12/07/2018    MONOSABS 1.52 12/06/2018    MONOSABS 0.58 12/06/2018    MONOSABS 0.65 12/05/2018    LYMPHSABS 0.82 12/08/2018    LYMPHSABS 1.79 12/07/2018    LYMPHSABS 2.24 12/06/2018    LYMPHSABS 0.98 12/06/2018    LYMPHSABS 2.40 12/05/2018    EOSABS 0.18 12/08/2018    EOSABS 0.12 12/07/2018    EOSABS 0.10 12/06/2018    EOSABS 0.00 12/06/2018    EOSABS 0.20 12/05/2018    BASOSABS 0.09 12/08/2018    BASOSABS 0.00 12/07/2018    BASOSABS 0.06 12/06/2018    BASOSABS 0.00 12/06/2018    BASOSABS 0.00 12/05/2018     CMP:    Lab Results   Component Value Date     12/08/2018    K 3.6 12/08/2018    CL 94 12/08/2018    CO2 26 12/08/2018    BUN 65 12/08/2018    BUN 72 12/07/2018    BUN 66 12/07/2018    BUN 71 12/06/2018    BUN 74 12/05/2018    CREATININE 6.4 12/08/2018    CREATININE 6.6 12/07/2018    CREATININE 6.2 12/07/2018    CREATININE 6.5 12/06/2018    CREATININE 6.8 12/05/2018    GFRAA 10 12/08/2018    LABGLOM 8 12/08/2018    GLUCOSE 265 12/08/2018    GLUCOSE 104 05/16/2012    PROT 5.1 12/08/2018    LABALBU 2.5 12/08/2018    LABALBU 3.7 05/16/2012    CALCIUM 8.4 12/08/2018    BILITOT 0.3 12/08/2018

## 2018-12-09 NOTE — PROGRESS NOTES
Pt. Requesting BIPAP be taken off stating \"I can't breath with this on\"  Informed patient how important it is that he wears the BIPAP. Patient adamant about removing the BIPAP. BIPAP removed and patient placed on 5 liters nasal cannula.

## 2018-12-09 NOTE — PROGRESS NOTES
- -   12/09/18 1000 (!) 103/54 - - 86 16 97 % - -   12/09/18 0900 (!) 97/47 - - 86 22 98 % - -   12/09/18 0815 (!) 113/53 98 °F (36.7 °C) Axillary - - - 5' 5\" (1.651 m) 188 lb 11.4 oz (85.6 kg)   12/09/18 0800 (!) 113/53 97.5 °F (36.4 °C) - 93 27 99 % - -   12/09/18 0700 (!) 114/46 - - 85 (!) 31 100 % - -   12/09/18 0644 - 98.2 °F (36.8 °C) Temporal - - - - -   12/09/18 0602 99/60 - - 105 21 98 % - -   12/09/18 0600 - - - - - - - 188 lb 11.4 oz (85.6 kg)   12/09/18 0500 104/65 - - 88 20 99 % - -   12/09/18 0400 (!) 98/47 - - 97 20 98 % - -   12/09/18 0300 (!) 114/54 - - 105 22 100 % - -   12/09/18 0200 (!) 98/44 - - 100 20 98 % - -   12/09/18 0102 (!) 85/41 - - 103 23 100 % - -   12/08/18 2351 - 98.2 °F (36.8 °C) Temporal - - - - -   12/08/18 2215 - - - 107 - - - -   12/08/18 2200 (!) 109/44 - - 92 19 98 % - -   12/08/18 2100 (!) 97/51 - - 99 20 98 % - -   12/08/18 2049 - - - 98 (!) 2 99 % - -   12/08/18 2000 (!) 123/54 98.3 °F (36.8 °C) Temporal 99 14 98 % - -   12/08/18 1900 (!) 104/50 - - 98 24 95 % - -   12/08/18 1840 100/69 98 °F (36.7 °C) - - - 98 % - -   12/08/18 1800 100/69 - - 101 22 99 % - -       CBC with Differential:    Lab Results   Component Value Date    WBC 13.5 12/09/2018    WBC 10.3 12/08/2018    WBC 12.8 12/07/2018    WBC 18.3 12/06/2018    WBC 19.5 12/06/2018    RBC 3.10 12/09/2018    RBC 3.09 12/08/2018    RBC 3.03 12/07/2018    RBC 3.03 12/06/2018    RBC 2.90 12/06/2018    HGB 9.4 12/09/2018    HGB 9.4 12/08/2018    HGB 9.2 12/07/2018    HGB 9.3 12/06/2018    HGB 8.9 12/06/2018    HCT 31.1 12/09/2018    HCT 30.3 12/08/2018    HCT 29.0 12/07/2018    HCT 28.7 12/06/2018    HCT 27.5 12/06/2018     12/09/2018    PLT 99 12/08/2018    PLT 97 12/07/2018    PLT 93 12/06/2018    PLT 91 12/06/2018    .3 12/09/2018    MCV 98.1 12/08/2018    MCV 95.7 12/07/2018    MCV 94.7 12/06/2018    MCV 94.8 12/06/2018    MCH 30.3 12/09/2018    MCH 30.4 12/08/2018    MCH 30.4 12/07/2018    MCH 30.7 12/06/2018    MCH 30.7 12/06/2018    MCHC 30.2 12/09/2018    MCHC 31.0 12/08/2018    MCHC 31.7 12/07/2018    MCHC 32.4 12/06/2018    MCHC 32.4 12/06/2018    RDW 14.2 12/09/2018    RDW 14.2 12/08/2018    RDW 14.1 12/07/2018    RDW 13.9 12/06/2018    RDW 13.9 12/06/2018    SEGSPCT 61 03/18/2014    SEGSPCT 55 12/09/2013    SEGSPCT 61 08/28/2013    SEGSPCT 53 03/15/2013    SEGSPCT 59 12/11/2012    METASPCT 1.8 12/04/2018    LYMPHOPCT 13.2 12/09/2018    LYMPHOPCT 7.8 12/08/2018    LYMPHOPCT 13.9 12/07/2018    LYMPHOPCT 12.2 12/06/2018    LYMPHOPCT 5.2 12/06/2018    MONOPCT 7.9 12/09/2018    MONOPCT 7.8 12/08/2018    MONOPCT 10.4 12/07/2018    MONOPCT 8.3 12/06/2018    MONOPCT 2.6 12/06/2018    MYELOPCT 0.9 12/08/2018    BASOPCT 0.3 12/09/2018    BASOPCT 0.9 12/08/2018    BASOPCT 0.2 12/07/2018    BASOPCT 0.3 12/06/2018    BASOPCT 0.2 12/06/2018    MONOSABS 1.07 12/09/2018    MONOSABS 0.82 12/08/2018    MONOSABS 1.28 12/07/2018    MONOSABS 1.52 12/06/2018    MONOSABS 0.58 12/06/2018    LYMPHSABS 1.78 12/09/2018    LYMPHSABS 0.82 12/08/2018    LYMPHSABS 1.79 12/07/2018    LYMPHSABS 2.24 12/06/2018    LYMPHSABS 0.98 12/06/2018    EOSABS 0.11 12/09/2018    EOSABS 0.18 12/08/2018    EOSABS 0.12 12/07/2018    EOSABS 0.10 12/06/2018    EOSABS 0.00 12/06/2018    BASOSABS 0.04 12/09/2018    BASOSABS 0.09 12/08/2018    BASOSABS 0.00 12/07/2018    BASOSABS 0.06 12/06/2018    BASOSABS 0.00 12/06/2018     CMP:    Lab Results   Component Value Date     12/09/2018    K 4.0 12/09/2018    CL 95 12/09/2018    CO2 24 12/09/2018    BUN 68 12/09/2018    BUN 65 12/08/2018    BUN 72 12/07/2018    BUN 66 12/07/2018    BUN 71 12/06/2018    CREATININE 6.5 12/09/2018    CREATININE 6.4 12/08/2018    CREATININE 6.6 12/07/2018    CREATININE 6.2 12/07/2018    CREATININE 6.5 12/06/2018    GFRAA 10 12/09/2018    LABGLOM 8 12/09/2018    GLUCOSE 262 12/09/2018    GLUCOSE 104 05/16/2012    PROT 5.0 12/09/2018    LABALBU 2.1 12/09/2018    LABALBU 3.7 in the right lung base.             XR CHEST PORTABLE    (Results Pending)       Assessment  RT LUNG PNEUMONIA  E COLI SEPSIS  RXED TB  THRUSH  Plan  ERTAPENEM -WBC TRENDING DOWN  NYSTATIN S/S  ADD DIFLUCAN    Electronically signed by Edvin Andino MD on 12/9/2018 at 5:28 PM

## 2018-12-09 NOTE — PROGRESS NOTES
[address] [facility]  Department of Internal Medicine  Internal Medicine Residency Program  Resident MICU Progress  Note      Patient:  Spencer Reynolds 80 y.o. male MRN: [id number]     Date of Service: 12/9/2018    Allergy: Fenofibrate and Lipitor  CC: F/u: SOB  Subjective       Patient see and examined at bedside.  - Afib rate decreased to <100. - Feels weak and has low appetite. Follows command.   - Rhonchi on lung exam. satting fine. CXR showed improved R lung opacity. - Around noon he had an episode of nonsustained VT during which he remained conscious and had palpitation. PerfectServed Dr. Andres Kerr, he suggested no Rx and said he has signed off.      Objective   Physical Exam:  · Vitals: BP (!) 103/50   Pulse 93   Temp 98.5 °F (36.9 °C)   Resp (!) 31   Ht 5' 5\" (1.651 m)   Wt 188 lb 11.4 oz (85.6 kg)   SpO2 97%   BMI 31.40 kg/m²     I & O - 24hr: In: 50 [I.V.:50]  · Out: 1451 [Urine:10]  § General Appearance: alert and oriented to person, place and time, well-developed and well-nourished, weak but not in distress  § Skin: warm and dry, no rash or erythema  § Head: normocephalic and atraumatic  § Eyes: pupils equal, round, and reactive to light, extraocular eye movements intact, conjunctivae normal  § Neck: neck supple and non tender without mass, no thyromegaly or thyroid nodules, no cervical lymphadenopathy   § Pulmonary/Chest: rhonchi present- bilaterally   § Cardiovascular: normal S1 and S2, no gallops, intact distal pulses and no carotid bruits  § Abdomen: soft, mild diffuse tenderness, bowel sounds normal, no masses, no organomegaly and no abdominal bruits  § Extremities: no cyanosis, no clubbing and no edema  § Musculoskeletal: normal range of motion, no joint swelling, deformity or tenderness  § Neurologic: speech normal, muscle strength normal and no tremor     Pertinent New Labs & Imaging Studies     CBC:   Lab Results   Component Value Date    WBC 13.5 12/09/2018    RBC 3.10 12/09/2018 pleural effusions in lung bases which may be due to pneumonia or edema. Examination is limited due to the lack of intravenous contrast. Grossly, liver is heterogeneously decreased in attenuation which may be  due to fatty infiltration. There is a moderate amount of high-density ascites with fluid surrounding the liver, paracolic gutters, mesentery and pelvis. A large cystic collection is identified in the expected location of gallbladder which may be inflamed gallbladder. If cholecystitis is a consideration, consider ultrasonography or hepatobiliary scan. Spleen appears normal. There is slight prominence of the uncinate process of pancreas. Small lymph nodes are identified in the peripancreatic area. Adrenals appear grossly unremarkable. Left kidney is atrophic. There is a 4 x 4.6 cm hyperdense lesion in the right kidney with additional small cystic lesions. Both hemorrhagic or proteinaceous cyst versus solid mass involving the kidneys considered. Degenerative changes are identified in the lumbar spine Pelvis. Bladder is contracted with  wall thickening and Salazar catheter. High-density ascites is present in the pelvis. Colon is collapsed. Appendix is noted. There is a small left inguinal hernia with herniation of ascites fluid into the hernia sac. Limited noncontrast study. There is heterogeneous appearance of liver concerning for liver disease with a moderate amount of high-density ascites. Hemorrhagic or malignant ascites has to be excluded. Apparently thickened and distended gallbladder is also identified. If cholecystitis is considered, consider ultrasonography and or hepatobiliary scan. 4 x 4.6 cm hyperdense lesion in the right kidney which may represent proteinaceous or hemorrhagic cyst or solid mass. There is also slight prominence of the uncinate process of the pancreas. Surveillance is recommended. Infiltrates and atelectasis in the right lung base.      Ct Chest Wo Contrast    Result Date: pressure stable today  2/2 pneumonia, GNR bacteremia  CXR opacities on upper right lobe   Wbc trending down   Lactic acid 2.2, now trending down  Cortisol 28.62  Peritoneal fluid cell count 30 with 33% neut r/o SBP  Sputum, urine, blood culture>>positive for GNR x 2  Meropenem changed to Invanz  ID consulted    Anemia  normocytic  Anemia of chronic disease  Give aranesp 40 mcg weekly  Baseline 9     DVT prophylaxis/ GI prophylaxis: coumadin  Disposition: AGUSTIN Hector M.D., PGY 1    Attending physician: Dr. Jose Juan Chan

## 2018-12-10 NOTE — PROGRESS NOTES
is A & O x 4  RASS:  -1 to 0  CAM-ICU:  Negative   Sensation:  Pt denies numbness and tingling to extremities  Edema:  Unremarkable     Vitals:  Blood Pressure at rest 106/54 Blood Pressure  post session 114/58   Heart Rate at rest 107 bpm Heart Rate post session 104 bpm   SPO2 at rest 89% on 6 L  SPO2 post session 99% on 6 L     Functional Status Score-Intensive Care Unit (FSS-ICU)   Rolling 3/7   Supine to sit transfer 2/7   Unsupported sitting  2/7   Sit to stand transfers 0/7   Ambulation 0/7   Total  7/35     Patient education  Pt educated on safety during functional mobility, hand placement during bed mobility/transfers, and sitting balance. Patient response to education:   Pt verbalized understanding Pt demonstrated skill Pt requires further education in this area   Yes  No  Yes // Reinforce      Comments:  Pt received supine and agreeable to PT treatment. Pt cleared for participation by RN prior to session. Pt requiring increased assist for all mobility today. Pt more lethargic today as well. Assisted pt to EOB with heavy assist of trunk. Pt O2 saturation improved once sitting up. SpO2 at EOB 98-99% on 6 L via NC. Pt with heavy L lean at EOB. Unable to correct with max verbal and tactile cues. Much physical assist provided to maintain sitting upright at EOB. Pt c/o more SOB today and has more anxiety of falling. Pt educated and instructed on proper sitting balance and safety. Pt assisted with scoot out to EOB so feet would meet the floor. Pt unable to correct sitting balance and still with heavy L sided lean. Pt also noted to be less conversive today and having difficulty following motor commands -- states he could not sleep last night. Pt refused STS attempt. Pt returned to supine and placed in chair position. Pt placed in the chair position with pillows utilized to facilitate upright posture, joint and skin integrity, and interaction with environment.    SpO2 improved with upright

## 2018-12-10 NOTE — CARE COORDINATION
12/10  Transition of care notes  Remains in icu  On 6l nc  Alert  Per nursing decrease uo  On iv bumex invanz diflucan  Awaiting icu team for plan of care  No family present  On CAPD  Now a limited code  Palliative care following  Family has george list  Awaiting icu team for plan  AMELIA Monroe RN Case Manager

## 2018-12-10 NOTE — PROGRESS NOTES
POWER MIDLINE Placement 12/10/2018    Product number: [id number]   Lot Number: [id number]      Ultrasound: YES   R Basilic: Upper Arm Circumference: 30CM    Size: 4.5F    Exposed Length: 4CM    Internal Length: 11CM   Cut: 0   Vein Measurement: 0.5CM    Alize Frazier  12/10/2018  9:34 AM

## 2018-12-10 NOTE — PROGRESS NOTES
12/03/2018    PO2 190.7 12/03/2018    HCO3 19.3 12/03/2018    BE -4.9 12/03/2018    THB 11.9 12/03/2018    O2SAT 98.6 12/03/2018        Medications     Infusions: (Fluid, Sedation, Vasopressors)  IVF: None    Nutrition:     ATB:   Antibiotics  Days   Invanz  8   diflucan 1         Skin issues: none  Patient currently has   Urinary cath  Isolation  Restraints  DVT prophylaxis/ GI prophylaxis,    PT/OT  SNF/NH placement  Palliative care consult   Labs     CBC with Differential:    Lab Results   Component Value Date    WBC 14.1 12/10/2018    RBC 2.92 12/10/2018    HGB 8.9 12/10/2018    HCT 29.6 12/10/2018     12/10/2018    .4 12/10/2018    MCH 30.5 12/10/2018    MCHC 30.1 12/10/2018    RDW 14.0 12/10/2018    SEGSPCT 61 03/18/2014    METASPCT 1.8 12/04/2018    LYMPHOPCT 12.3 12/10/2018    MONOPCT 6.4 12/10/2018    MYELOPCT 0.9 12/08/2018    BASOPCT 0.2 12/10/2018    MONOSABS 0.90 12/10/2018    LYMPHSABS 1.74 12/10/2018    EOSABS 0.11 12/10/2018    BASOSABS 0.03 12/10/2018     CMP:    Lab Results   Component Value Date     12/10/2018    K 3.8 12/10/2018    CL 93 12/10/2018    CO2 22 12/10/2018    BUN 69 12/10/2018    CREATININE 6.8 12/10/2018    GFRAA 9 12/10/2018    LABGLOM 8 12/10/2018    GLUCOSE 289 12/10/2018    GLUCOSE 104 05/16/2012    PROT 4.7 12/10/2018    LABALBU 2.0 12/10/2018    LABALBU 3.7 05/16/2012    CALCIUM 8.3 12/10/2018    BILITOT 0.3 12/10/2018    ALKPHOS 67 12/10/2018    AST 9 12/10/2018    ALT 13 12/10/2018     Magnesium:    Lab Results   Component Value Date    MG 2.0 12/10/2018     Phosphorus:    Lab Results   Component Value Date    PHOS 5.0 12/10/2018     PT/INR:    Lab Results   Component Value Date    PROTIME 14.8 12/10/2018    INR 1.3 12/10/2018       Imaging Studies:  CXR:   12/09/18  Tortuous ectatic aorta  Cardiomegaly  Airspace disease compatible with pneumonia most severe involving the  right upper lung  The chest appears improved in the interval    USG abdomen

## 2018-12-10 NOTE — CARE COORDINATION
SOCIAL WORK/CASEMANAGEMENT TRANSITION OF CARE PLANNING: I met with wife again and went over sars that take peritoneal dialysis. She is down to [facility] and continuing care of [place]. Referral made to both. Told wife that I would follow up with her in a.m. For choice. precert  not needed.  Junior Epley  12/10/2018

## 2018-12-10 NOTE — PROGRESS NOTES
Palliative Care Department  Palliative Care Progress Note  Provider: [facility] days prior to Consult: Hospital Day: 8    Referring Provider:  Dr. Sharifa Oswald    Reason for Consult:  [x]  Code status Discussion  [x]  Assist with goals of care  [x]  Psychosocial support  [x]  Symptom Management      Chief Complaint: SOB    Subjective:   12/10/18  Patient's wife at bedside  RNs are present in room attempting to get patient ready to be transferred out of the critical care unit  Overall he is doing slightly better and is more stable so he can be transferred now. There and disturbed and doesn't appear to be any distress. 12/7/18  HPI:   Robert Jacobsen is a 80 y.o. male with significant past medical history of resolved Tuberculosis, ESRD on peritoneal dialysis nightly, HTN, HLD, DMT2, Valvular disease, Asthma. HFpEF, and COPD who presented to ED for fever, increasing SOB, tachycardia and complaints of chest tightness. In ED, pt had worsening SOB and with hypotension.  CXR showed right lung opacification, and Pt found on A/fib RVR. IV fluids were started with improvement in BP. Workup including labs noted:  Na 129, Cr 7.9. BUN 95, pro BNP 46,375, WBC 22.8.  HFpEF 65%. Pt reports PD fluid has not changed in color, it is yellowish but not turbid. Pt still makes a little amount of urine daily but it has been decreasing steadily. Pt was admitted to ICU for treatment for septic shock; pneumonia and CHF; new A-fib/RVR. ID, Cardiology and Nephrology following. Palliative Medicine has been consulted to assist pt/family with establishing goals of care; code status discussion; family support and symptom management.       Currently, Mr Cortney Alfaro is on oxygen per n/c 3.5L; he has been on intermittent BiPAP; SPO2 98%. He is alert and awake. Oriented x 4. Denies acute distress but states he still has some chest pressure. He continues on Cardizem and is now off pressors. BP .   BUN/creat trending down

## 2018-12-11 NOTE — DISCHARGE INSTR - COC
cannula. Ventilator: CPAP intermittent     Rehab Therapies: Physical Therapy and Occupational Therapy  Weight Bearing Status/Restrictions: No weight bearing restirctions  Other Medical Equipment (for information only, NOT a DME order):  hospital bed  Other Treatments:     Patient's personal belongings (please select all that are sent with patient):  None    RN SIGNATURE:  {Esignature:[id number]}    CASE MANAGEMENT/SOCIAL WORK SECTION    Inpatient Status Date:12/03/18    Readmission Risk Assessment Score:  Readmission Risk              Risk of Unplanned Readmission:        24           Discharging to Facility/ Agency   · Name: [facility]   · Address:  · Phone:  · Fax:    Dialysis Facility (if applicable)   · Name:  · Address:  · Dialysis Schedule:  · Phone:  · Fax:    / signature: Electronically signed by ED Soler on 12/12/18 at 12:37 PM    PHYSICIAN SECTION    Prognosis: Good    Condition at Discharge: Stable    Rehab Potential (if transferring to Rehab): {Prognosis:8660257350}    Recommended Labs or Other Treatments After Discharge: ***    Physician Certification: I certify the above information and transfer of Morena Ralph  is necessary for the continuing treatment of the diagnosis listed and that he requires LTAC for less 30 days.      Update Admission H&P: {CHP DME Changes in IZVQS:[id number]}    PHYSICIAN SIGNATURE:  Yoon Schulte MD.

## 2018-12-11 NOTE — PROGRESS NOTES
WBC 16.8 12/11/2018    RBC 2.92 12/11/2018    HGB 8.9 12/11/2018    HCT 29.7 12/11/2018    .7 12/11/2018    MCH 30.5 12/11/2018    MCHC 30.0 12/11/2018    RDW 14.0 12/11/2018     12/11/2018    MPV 10.3 12/11/2018     CMP:    Lab Results   Component Value Date     12/11/2018    K 3.6 12/11/2018    CL 95 12/11/2018    CO2 26 12/11/2018    BUN 69 12/11/2018    CREATININE 7.1 12/11/2018    GFRAA 9 12/11/2018    LABGLOM 7 12/11/2018    GLUCOSE 263 12/11/2018    GLUCOSE 104 05/16/2012    PROT 4.9 12/11/2018    LABALBU 2.4 12/11/2018    LABALBU 3.7 05/16/2012    CALCIUM 8.3 12/11/2018    BILITOT 0.3 12/11/2018    ALKPHOS 73 12/11/2018    AST 17 12/11/2018    ALT 16 12/11/2018     Magnesium:    Lab Results   Component Value Date    MG 2.3 12/11/2018     Phosphorus:    Lab Results   Component Value Date    PHOS 6.0 12/11/2018       Radiology Review:     CXR 12/06/18:    Airspace disease in the right lung compatible with pneumonia with   slight improvement since the prior chest radiograph. Opacities in the left lower lung may represent a developing infiltrate   or pulmonary edema. BRIEF SUMMARY OF INITIAL CONSULT:     Briefly Karol Jaramillo is 80 y.o. male with history of ESRD on CCPD, essential hypertension, hyperlipidemia, obesity, congenital absence of one kidney,  pulmonary hypertension, who was admitted on 12/3/2018 for hypoxic respiratory failure and septic shock. His symptoms started yesterday night  With right side chest pain and difficultly breathing, he describes some cough productive of yellow sputum. He contacted his PCP this morning who instructed to present to the ED. In the emergency room she was found to be hypotensive and with a large right lung infiltrate consistent with pneumonia. Laboratory findings were significant for Na: 129 mmol/L, chloride 91 mmol/L, creatinine 7.9 mg /dl lactic acid of 3.9. Reasons for this consult. IMPRESSION/RECOMMENDATIONS:      1.  ESRD he is on

## 2018-12-11 NOTE — PROGRESS NOTES
Physical Therapy    Daily treatment note     Name: Alona Olivares  : 10/20/1932  MRN: [id number]    Date of Service: 2018    Evaluating PT:  Maxine Suarez, PT, DPT [id number]    Room #:  1184/5446-L  Diagnosis:  Sepsis   PMHx:  Anemia, aortic regurgitation, asthma, bronchitis, cervical spinal cord compression, DM, enlarged left and right atrium, HD, HLD, HTN, kidney disorder (born with 1 kidney), L ventricular hypertrophy, mild aortic stenosis, mild mitral and tricuspid regurgitation, pulmonary HTN, TB   Precautions:  Falls, O2, [place]  Equipment Needs:  [employer]    Pt lives with wife in a 2 story home with 2 stairs to enter and 1 rail(s). There is a full flight of stairs to bedroom and bathroom but patient has a stair lift. Pt ambulated with [facility] or [facility] and required assist for ADLs. Per wife, [employer] pt uses at home is hers from an old surgery and is not in good condition. Pt will require his own [employer] at discharge. Initial Evaluation  Date: 18 Treatment  See above Short Term/ Long Term   Goals   AM-PAC 6 Clicks     Was pt agreeable to Eval/treatment? Yes  Yes    Does pt have pain? Pt c/o epigastric pain  Pt c/o stinson catheter discomfort     Bed Mobility  Rolling: Mod A  Supine to sit: Max A  Sit to supine: NT  Scooting: Mod A to EOB Rolling: dependent 2  Supine to sit: dependent 2  Sit to supine: dependent 2  Scooting: dependent too EOB SBA   Transfers Sit to stand: Mod A from bed;  Max A from chair   Stand to sit: Mod A  Stand pivot:  Mod A with [employer]  Sit to stand: attempted not able to clear pt off of the bed  Stand to sit: NT  Stand pivot: NT Min A with AAD   Ambulation    15 feet x2 with [employer] Mod A NT >50 feet with AAD Min A   Stair negotiation: ascended and descended  NT NT >2 steps with 2 rail Min A   ROM BUE:  Per OT  BLE:  WFL See eval     Strength BUE:  Per OT  BLE:  Grossly 4+/5  See eval     Balance Sitting EOB:  SBA  Dynamic Standing:  Min A with [employer]  Sitting EOB:  Max A  Dynamic Standing:

## 2018-12-11 NOTE — PROGRESS NOTES
Admit Date: 12/3/2018    Subjective: All event reviewed   Sleeping comfortably     Objective:     Patient Vitals for the past 8 hrs:   BP Temp Temp src Pulse Resp SpO2   12/11/18 0630 (!) 104/55 - - 96 - -   12/11/18 0045 (!) 117/56 97.8 °F (36.6 °C) Oral 112 20 92 %     I/O last 3 completed shifts: In: 250 [P.O.:200; IV Piggyback:50]  Out: 1903 [Urine:18]  No intake/output data recorded. HEENT: Normal  NECK: Thyroid normal. No carotid bruit. No lymphphadenopathy. CVS: RRR  RS: scattered rales rhonchi   ABD: Soft. Non tender. No mass. Normal BS. EXT: No edema. Non tender. Pulses present.       Scheduled Meds:   [START ON 12/6/2019] warfarin (COUMADIN) daily dosing (placeholder)   Other RX Placeholder    insulin lispro  0-18 Units Subcutaneous TID WC    insulin lispro  0-9 Units Subcutaneous Nightly    fluconazole  100 mg Intravenous Q24H    diltiazem  60 mg Oral 4 times per day    levalbuterol  0.63 mg Nebulization Q4H While awake    ertapenem (INVANZ) IVPB  500 mg Intravenous Q24H    pantoprazole  40 mg Oral QAM AC    darbepoetin hayde-polysorbate  40 mcg Subcutaneous Weekly    budesonide  500 mcg Nebulization BID    formoterol  20 mcg Nebulization BID    sodium chloride flush  10 mL Intravenous 2 times per day     Continuous Infusions:   dextrose         CBC with Differential:  Lab Results   Component Value Date    WBC 16.8 12/11/2018    RBC 2.92 12/11/2018    HGB 8.9 12/11/2018    HCT 29.7 12/11/2018     12/11/2018    .7 12/11/2018    MCH 30.5 12/11/2018    MCHC 30.0 12/11/2018    RDW 14.0 12/11/2018    SEGSPCT 61 03/18/2014    METASPCT 1.8 12/04/2018    LYMPHOPCT 11.0 12/11/2018    MONOPCT 7.4 12/11/2018    MYELOPCT 0.9 12/08/2018    BASOPCT 0.2 12/11/2018    MONOSABS 1.25 12/11/2018    LYMPHSABS 1.84 12/11/2018    EOSABS 0.13 12/11/2018    BASOSABS 0.04 12/11/2018     CMP:  Lab Results   Component Value Date     12/11/2018    K 3.6 12/11/2018    CL 95 12/11/2018    CO2

## 2018-12-12 NOTE — PROGRESS NOTES
CCPD term. W/O diff. Pt resting in no distress, abdomen is non-tender. 764cc of clear yellow efflurnt noted.

## 2018-12-12 NOTE — ANESTHESIA PRE PROCEDURE
Department of Anesthesiology  Preprocedure Note       Name:  Claribel Rosa   Age:  80 y.o.  :  10/20/1932                                          MRN:  [id number]         Date:  2018      Surgeon: Lea Sol):  Margot Mallory MD    Procedure: [facility] (N/A )    Medications prior to admission:   Prior to Admission medications    Medication Sig Start Date End Date Taking? Authorizing Provider   vitamin D (ERGOCALCIFEROL) 57628 units CAPS capsule Take 50,000 Units by mouth once a week   Yes Historical Provider, MD   predniSONE (DELTASONE) 5 MG tablet Take 5 mg by mouth 2 times daily   Yes Historical Provider, MD   triamcinolone (KENALOG) 0.1 % cream Apply topically 2 times daily Apply topically 2 times daily. Yes Historical Provider, MD   ammonium lactate (LAC-HYDRIN) 12 % lotion Apply topically as needed for Dry Skin Apply topically as needed.    Yes Historical Provider, MD   cetirizine (ZYRTEC) 10 MG tablet Take 10 mg by mouth daily   Yes Historical Provider, MD   calcium acetate (PHOSLO) 667 MG capsule Take 1,334 mg by mouth 3 times daily (with meals)   Yes Historical Provider, MD   metolazone (ZAROXOLYN) 2.5 MG tablet Take 2.5 mg by mouth daily   Yes Historical Provider, MD   nitroGLYCERIN (NITROSTAT) 0.4 MG SL tablet Place 1 tablet under the tongue every 5 minutes as needed for Chest pain 17  Yes Lalo Jasso MD   CARDIZEM  MG TB24 Take 2 tablets by mouth daily  11/15/17  Yes Vegap MD Ramin   B Complex-C-Zn-Folic Acid (DIALYVITE 233-ZMNC 15) TABS Take by mouth daily    Yes Historical Provider, MD   sertraline (ZOLOFT) 50 MG tablet Take 50 mg by mouth daily  17  Yes Historical Provider, MD   fluticasone-vilanterol (BREO ELLIPTA) 100-25 MCG/INH AEPB inhaler Inhale 1 puff into the lungs daily   Yes Historical Provider, MD   ALPHAGAN P 0.15 % ophthalmic solution See Admin Instructions Instill in affected eye(s) as instructed by your doctor 16  Yes Historical Provider, MD   [facility] Applicable):  No results found for: LABABO, LABRH     EKG 12/7/2018  Narrative     Atrial fibrillation with rapid ventricular response  Incomplete left bundle branch block  Abnormal ECG  When compared with ECG of 04-DEC-2018 00:28,  No significant change was found     ECHO 12/4/2018   Summary   Ejection fraction is visually estimated at 55%.   No regional wall motion abnormalities seen.   Moderate left ventricular concentric hypertrophy noted.   Normal right ventricle structure and function.   Left atrial volume index of 46 ml per meters squared BSA.   Moderate aortic stenosis is present.   The aortic valve area is 1.2 cm2 with a maximum gradient of 38 mmHg and a   mean gradient of 20 mmHg.   Mild aortic regurgitation is noted.   Physiologic and/or trace mitral regurgitation is present.   Moderate tricuspid regurgitation.   Pulmonary hypertension is moderate. RVSP is 51 mmHg. CT Chest 12/3/2018  Impression   Patchy and masslike infiltrative density in the right upper lobe and   right middle lobe which may represent, complicated pneumonia or   malignancy and surveillance is recommended to see complete clearing. Patchy and nodular infiltrative densities in the right lung base is   also identified. 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 Surveillance recommended. CT Abdomen/Pelvis 12/3/2018  Impression   Limited noncontrast study. There is heterogeneous appearance of liver   concerning for liver disease with a moderate amount of high-density   ascites. Hemorrhagic or malignant ascites has to be excluded. Apparently thickened and distended gallbladder is also identified. If   cholecystitis is considered, consider ultrasonography and or   hepatobiliary scan.       4 x 4.6 cm hyperdense lesion in the right kidney which may represent    proteinaceous or hemorrhagic cyst or solid mass. There is also slight   prominence of the uncinate process of the pancreas.  Surveillance is   recommended.     Infiltrates and atelectasis in the right lung base. Anesthesia Evaluation  Patient summary reviewed and Nursing notes reviewed no history of anesthetic complications:   Airway: Mallampati: III  TM distance: >3 FB   Neck ROM: full  Mouth opening: > = 3 FB Dental:      Comment: Pt denies loose or chipped teeth    Pulmonary:   (+) shortness of breath:  decreased breath sounds,  asthma:                           ROS comment: Currently on 6 L NC   Cardiovascular:    (+) hypertension:, valvular problems/murmurs:, dysrhythmias: SVT and atrial fibrillation, pulmonary hypertension:, hyperlipidemia      ECG reviewed  Rhythm: regular  Rate: normal  Echocardiogram reviewed                  Neuro/Psych:   (+) psychiatric history:             ROS comment: Pt A+O to self only, information obtained from chart  Cervical spinal compression GI/Hepatic/Renal:   (+) renal disease (Pt on peritoneal dialysis last treatment 12/12/2018): ESRD,           Endo/Other:    (+) Diabetes, blood dyscrasia: anticoagulation therapy:., .                 Abdominal:           Vascular:                                  Anesthesia Plan      MAC     ASA 4     (Right upper arm midline)  Induction: intravenous. Anesthetic plan and risks discussed with patient. Plan discussed with CRNA and attending. Sherita Gosselin, RN   12/12/2018    Pt seen, examined, chart reviewed, plan discussed.   [facility]  12/12/2018

## 2018-12-12 NOTE — PROGRESS NOTES
of 3.9. Reasons for this consult. IMPRESSION/RECOMMENDATIONS:      1. ESRD he is on peritoneal dialysis he is tolerating with no problems   2. Sepsis 2/2 to gram negative bacteremia E-Coli. on Invanz. 3. S/p Acute hypoxic respiratory failure on intermittent BiPAP   4. Type II DM on insulin sliding scale   5. MBD of CKD: binders are on hold, normal phosphors levels   6. Normocytic anemia due to CKD hemoglobin is stable, on Aranesp   7. Atrial fibrillation currently on Cardizem and Coumadin.    8. Nutrition: renal diet with fluid restriction 1 L      PLAN:    · Continue with ccpd   · Added binders with food   · But high phos likely catabolic from poor po intake and current condition   · Will adjust ccpd for more clearance if worsening labs       Electronically signed by Lucina Garcia MD on 12/12/2018 at 1:51 PM

## 2018-12-12 NOTE — PROGRESS NOTES
Results   Component Value Date     12/11/2018    K 3.6 12/11/2018    CL 95 12/11/2018    CO2 26 12/11/2018    BUN 69 12/11/2018    CREATININE 7.1 12/11/2018    GFRAA 9 12/11/2018    LABGLOM 7 12/11/2018    PROT 4.9 12/11/2018    LABALBU 2.4 12/11/2018    LABALBU 3.7 05/16/2012    CALCIUM 8.3 12/11/2018    BILITOT 0.3 12/11/2018    ALKPHOS 73 12/11/2018    AST 17 12/11/2018    ALT 16 12/11/2018     PT/INR:    Lab Results   Component Value Date    PROTIME 14.1 12/12/2018    INR 1.2 12/12/2018       Assessment:     Active Problems:    Sepsis ([address]    Atrial fibrillation with rapid ventricular response (HCC)    Acute respiratory failure with hypoxia (HCC)    ESRD    Pneumonia    Diabetes       Plan:   Slow improvement ,will decrease Cardizem ,for bronch.  Today ,continue ATB

## 2018-12-12 NOTE — PROGRESS NOTES
Value Date    WBC 16.8 12/11/2018    WBC 14.1 12/10/2018    WBC 13.5 12/09/2018    WBC 10.3 12/08/2018    WBC 12.8 12/07/2018    RBC 2.92 12/11/2018    RBC 2.92 12/10/2018    RBC 3.10 12/09/2018    RBC 3.09 12/08/2018    RBC 3.03 12/07/2018    HGB 8.9 12/11/2018    HGB 8.9 12/10/2018    HGB 9.4 12/09/2018    HGB 9.4 12/08/2018    HGB 9.2 12/07/2018    HCT 29.7 12/11/2018    HCT 29.6 12/10/2018    HCT 31.1 12/09/2018    HCT 30.3 12/08/2018    HCT 29.0 12/07/2018     12/11/2018     12/10/2018     12/09/2018    PLT 99 12/08/2018    PLT 97 12/07/2018    .7 12/11/2018    .4 12/10/2018    .3 12/09/2018    MCV 98.1 12/08/2018    MCV 95.7 12/07/2018    MCH 30.5 12/11/2018    MCH 30.5 12/10/2018    MCH 30.3 12/09/2018    MCH 30.4 12/08/2018    MCH 30.4 12/07/2018    MCHC 30.0 12/11/2018    MCHC 30.1 12/10/2018    MCHC 30.2 12/09/2018    MCHC 31.0 12/08/2018    MCHC 31.7 12/07/2018    RDW 14.0 12/11/2018    RDW 14.0 12/10/2018    RDW 14.2 12/09/2018    RDW 14.2 12/08/2018    RDW 14.1 12/07/2018    SEGSPCT 61 03/18/2014    SEGSPCT 55 12/09/2013    SEGSPCT 61 08/28/2013    SEGSPCT 53 03/15/2013    SEGSPCT 59 12/11/2012    METASPCT 1.8 12/04/2018    LYMPHOPCT 11.0 12/11/2018    LYMPHOPCT 12.3 12/10/2018    LYMPHOPCT 13.2 12/09/2018    LYMPHOPCT 7.8 12/08/2018    LYMPHOPCT 13.9 12/07/2018    MONOPCT 7.4 12/11/2018    MONOPCT 6.4 12/10/2018    MONOPCT 7.9 12/09/2018    MONOPCT 7.8 12/08/2018    MONOPCT 10.4 12/07/2018    MYELOPCT 0.9 12/08/2018    BASOPCT 0.2 12/11/2018    BASOPCT 0.2 12/10/2018    BASOPCT 0.3 12/09/2018    BASOPCT 0.9 12/08/2018    BASOPCT 0.2 12/07/2018    MONOSABS 1.25 12/11/2018    MONOSABS 0.90 12/10/2018    MONOSABS 1.07 12/09/2018    MONOSABS 0.82 12/08/2018    MONOSABS 1.28 12/07/2018    LYMPHSABS 1.84 12/11/2018    LYMPHSABS 1.74 12/10/2018    LYMPHSABS 1.78 12/09/2018    LYMPHSABS 0.82 12/08/2018    LYMPHSABS 1.79 12/07/2018    EOSABS 0.13 12/11/2018    EOSABS

## 2018-12-12 NOTE — PROGRESS NOTES
Endurance/Activity Tolerance F for moderate tasks Poor+; pt showing increased alerrtness. G    during 15-20 min ADL task    Visual/  Perceptual Glasses: pt wears reading glasses     Pt wears reading glasses.     UE strengthening     See UE Assessment Below AAROM to BUEs. G tolerance  For BUE AROM/AAROM in all planes to improve overall function for ADL tasks       UE Assessment  Hand dominance: R  Hearing: [place]; wears hearing aides   Sensation: pt c/o NT in Bilateral feet  Tone: generalized weakness  Edema: Mild in UEs    Comments: Upon arrival pt in supine with wife in room. At end of session pt left up in the chair position in the bed, all lines and tubes intact, call light within reach, and wife left in room at side. · Pt has made fair progress towards set goals.    · Continue with current plan of care    Time in: 10:30  Time out: 11:00  Total Tx Time: 30 mins    Cesar TALLEY/CHASE [id number]

## 2018-12-13 PROBLEM — E44.0 MODERATE PROTEIN-CALORIE MALNUTRITION (HCC): Chronic | Status: ACTIVE | Noted: 2018-01-01

## 2018-12-13 NOTE — PROGRESS NOTES
weaknesses   Other: has +1 edema involving bilateral thighs      DATA:    CBC:   Lab Results   Component Value Date    WBC 13.1 12/13/2018    RBC 2.77 12/13/2018    HGB 8.4 12/13/2018    HCT 28.5 12/13/2018    .9 12/13/2018    MCH 30.3 12/13/2018    MCHC 29.5 12/13/2018    RDW 13.8 12/13/2018     12/13/2018    MPV 10.2 12/13/2018     CMP:    Lab Results   Component Value Date     12/13/2018    K 3.4 12/13/2018    CL 94 12/13/2018    CO2 26 12/13/2018    BUN 71 12/13/2018    CREATININE 7.2 12/13/2018    GFRAA 9 12/13/2018    LABGLOM 7 12/13/2018    GLUCOSE 232 12/13/2018    GLUCOSE 104 05/16/2012    PROT 4.9 12/13/2018    LABALBU 2.4 12/13/2018    LABALBU 3.7 05/16/2012    CALCIUM 8.0 12/13/2018    BILITOT 0.3 12/13/2018    ALKPHOS 72 12/13/2018    AST 14 12/13/2018    ALT 13 12/13/2018     Magnesium:    Lab Results   Component Value Date    MG 2.0 12/13/2018     Phosphorus:    Lab Results   Component Value Date    PHOS 6.7 12/13/2018       Radiology Review:     CXR 12/06/18:    Airspace disease in the right lung compatible with pneumonia with   slight improvement since the prior chest radiograph. Opacities in the left lower lung may represent a developing infiltrate   or pulmonary edema. BRIEF SUMMARY OF INITIAL CONSULT:     Briefly Rosa Ga is 80 y.o. male with history of ESRD on CCPD, essential hypertension, hyperlipidemia, obesity, congenital absence of one kidney,  pulmonary hypertension, who was admitted on 12/3/2018 for hypoxic respiratory failure and septic shock. His symptoms started yesterday night  With right side chest pain and difficultly breathing, he describes some cough productive of yellow sputum. He contacted his PCP this morning who instructed to present to the ED. In the emergency room she was found to be hypotensive and with a large right lung infiltrate consistent with pneumonia.  Laboratory findings were significant for Na: 129 mmol/L, chloride 91 mmol/L, creatinine 7.9 mg /dl lactic acid of 3.9. Reasons for this consult. IMPRESSION/RECOMMENDATIONS:      1. ESRD he is on peritoneal dialysis he is tolerating with no problems   2. Sepsis 2/2 to gram negative bacteremia E-Coli. on Invanz. 3. S/p Acute hypoxic respiratory failure on intermittent BiPAP   4. Type II DM on insulin sliding scale   5. MBD of CKD: binders are on hold, normal phosphors levels   6. Normocytic anemia due to CKD hemoglobin is stable, on Aranesp   7. Atrial fibrillation currently on Cardizem and Coumadin.    8. Nutrition: renal diet with fluid restriction 1 L      PLAN:    · Continue with CCPD   · Okay for ASTRID transfer when okay with others   · Increased darbo to 60   · Continue with binders         Electronically signed by Antonio Neff MD on 12/13/2018 at 11:20 AM

## 2018-12-13 NOTE — PLAN OF CARE
Problem: Risk for Impaired Skin Integrity  Goal: Tissue integrity - skin and mucous membranes  Structural intactness and normal physiological function of skin and  mucous membranes.    Outcome: Met This Shift      Problem: Falls - Risk of:  Goal: Will remain free from falls  Will remain free from falls   Outcome: Met This Shift    Goal: Absence of physical injury  Absence of physical injury   Outcome: Met This Shift      Problem: Airway Clearance - Ineffective:  Goal: Ability to maintain a clear airway will improve  Ability to maintain a clear airway will improve   Outcome: Met This Shift

## 2018-12-13 NOTE — PROGRESS NOTES
CCPD term w/o diff. 1470cc of clear yellow effluent noted. Pt confused . PD cath site clean and dry.

## 2018-12-13 NOTE — PROGRESS NOTES
Nutrition Assessment    Type and Reason for Visit: Reassess    Nutrition Recommendations: Continue current diet, Continue current ONS    Nutrition Assessment: Pt declining from a nutritional standpoint AEB meeting moderate malnutrition criteria d/t mild muscle loss and ongoing <50% PO intake since admit. Pt at further compromise d/t metabolic demand for ESRD w/ PD. Will continue current ONS as ordered. Malnutrition Assessment:  · Malnutrition Status: Meets the criteria for moderate malnutrition  · Context: Acute illness or injury  · Findings of the 6 clinical characteristics of malnutrition (Minimum of 2 out of 6 clinical characteristics is required to make the diagnosis of moderate or severe Protein Calorie Malnutrition based on AND/ASPEN Guidelines):  1. Energy Intake-Less than 50% of estimated energy requirement for greater than or equal to 5 days,      2. Weight Loss-No significant weight loss,    3. Fat Loss-No significant subcutaneous fat loss,    4. Muscle Loss-Mild muscle mass loss, Clavicles (pectoralis and deltoids)  5. Fluid Accumulation-No significant fluid accumulation,    6.  Strength-Not measured    Nutrition Risk Level: Moderate    Nutrient Needs:  · Estimated Daily Total Kcal: 6637-5718 (MSJ 1436 x 1.3 SF)  · Estimated Daily Protein (g): 80-95 (1.3-.15 gm/kg IBW)  · Estimated Daily Total Fluid (ml/day): 1000 ml per orders     Nutrition Diagnosis:   · Problem:  Moderate malnutrition, In context of acute illness or injury  · Etiology: related to Insufficient energy/nutrient consumption     Signs and symptoms:  as evidenced by Intake 0-25%, Diet history of poor intake, Known losses from dialysis, Mild muscle loss    Objective Information:  · Nutrition-Focused Physical Findings: pt alert, active BS, abd soft, trace edema, weakness, -I/Os, hyperphosphatemia  · Wound Type: None  · Current Nutrition Therapies:  · Oral Diet Orders: Renal, Carb Control 4 Carbs/Meal, Fluid Restriction (1000 ml)

## 2018-12-13 NOTE — PROGRESS NOTES
Feeding IND  SBA; Pt able to complete drinking from cup with Min verbal prompting. Grooming/Hygiene Max A for overall task  Mod A to comb hair seated in chair, pt limited by decreased ROM in shoulder  Min A;    Pt requires Min A to wash face and comb hair while sitting EOB. MIn A   while standing at sink    UB Dressing Mod A  After setup   Mod A;    Mod A to klever/doff gown with Mod verbal prompting to increase completion. SBA  With item retrieval    LB Dressing Max A  Pt reports at baseline his wife assists with donning socks, shoes, and pants/underwear   Dep; To klever/doff socks while in supine d/t poor ROM and trunk support. Min A   with AE PRN   Bathing Max A  N/T;    Dep A per last treatment.       Toileting Max A   Pt had BM using bedpan on bedside chair, pt required assist for clothing mgmt, pt able to assist with ashley care Dep; Pt requires Dep A to complete toileting tasks due to incontinence of BM. SBA  Including all clothing mgmt    Bed Mobility  Supine to sit: Max A  Sit to supine: NT; left in chair  Sup <> Sit: Max A of 2; Max A x 2 to transfer from supine to/from sitting position with max verbal prompting to increase proper positioning and safety.       Functional Transfers Sit to stand: Mod A from EOB high surface, Max A from chair with and without arm rests     Stand to sit: Mod A  Min cues for proper hand placement, sequencing and safety technique  Sit <> Stand: N/A;    Pt unable to complete a full stance with 4 attempts and Dep x 2. Min A   From varying surfaces with G safety    Functional Mobility Mod A with [facility]      Mobility: N/A  Min A  With [facility] household distances with G endurance    Balance Sitting:      Static: SBA    Dynamic: SBA  Standing: Min A with [facility]  Sitting:      Static: SBA    Dynamic: Min A  Standing: N/A       Endurance/Activity Tolerance F for moderate tasks Poor+/Fair-; pt showing increased alerrtness.   G    during 15-20 min ADL task

## 2018-12-13 NOTE — ANESTHESIA POSTPROCEDURE EVALUATION
Department of Anesthesiology  Postprocedure Note    Patient: Nils Burk  MRN: [id number]  YOB: 1932  Date of evaluation: 12/13/2018  Time:  6:38 AM     Procedure Summary     Date:  12/12/18 Room / Location:  [facility] / [facility]    Anesthesia Start:  1445 Anesthesia Stop:  4748    Procedures:       BRONCH (N/A )      BRONCHOSCOPY ALVEOLAR LAVAGE (Right ) Diagnosis:  (.)    Surgeon:  Alejandra De Leon MD Responsible Provider:  Celena Campos MD    Anesthesia Type:  MAC ASA Status:  4          Anesthesia Type: MAC    Raghu Phase I: Raghu Score: 8    Raghu Phase II:      Last vitals: Reviewed and per EMR flowsheets.        Anesthesia Post Evaluation    Patient location during evaluation: PACU  Patient participation: complete - patient participated  Level of consciousness: awake  Airway patency: patent  Nausea & Vomiting: no nausea and no vomiting  Complications: no  Cardiovascular status: hemodynamically stable  Respiratory status: acceptable  Hydration status: stable

## 2018-12-14 NOTE — PROGRESS NOTES
middle lobe which may represent, complicated pneumonia or  malignancy and surveillance is recommended to see complete clearing. Patchy and nodular infiltrative densities in the right lung base is  also identified. 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 Surveillance recommended.     Assessment and Plan       Acute hypoxic respiratory failure:    Can go on 5 L  No fever or chills  abx per ID  Bronch culture GRAM negative rods ,ID following      Acute exacerbation HFpE  Afib RVR  ESRD on peritoneal dialysis  HAGMA, resolved  Bacteremia   ID Following     Ana Cristina Granado MD,

## 2018-12-14 NOTE — PROGRESS NOTES
Department of Internal Medicine  Nephrology Progress Note      Events reviewed    SUBJECTIVE:  We are following Matt Shaffer for ESRD on peritoneal dialysis. Reports no new complaints.   weak tired but improving slowly / no new issues / still poor intake / not active strength     Scheduled Meds:   warfarin  1 mg Oral Once    potassium chloride  40 mEq Oral Once    [START ON 12/18/2018] darbepoetin hayde-polysorbate  60 mcg Subcutaneous Weekly    diltiazem  60 mg Oral 3 times per day    sevelamer  800 mg Oral TID WC    bumetanide  2 mg Oral BID    [START ON 12/6/2019] warfarin (COUMADIN) daily dosing (placeholder)   Other RX Placeholder    insulin lispro  0-18 Units Subcutaneous TID WC    insulin lispro  0-9 Units Subcutaneous Nightly    fluconazole  100 mg Intravenous Q24H    levalbuterol  0.63 mg Nebulization Q4H While awake    ertapenem (INVANZ) IVPB  500 mg Intravenous Q24H    pantoprazole  40 mg Oral QAM AC    budesonide  500 mcg Nebulization BID    formoterol  20 mcg Nebulization BID    sodium chloride flush  10 mL Intravenous 2 times per day     Continuous Infusions:   dextrose       PRN Meds:.acetaminophen, sodium chloride (Inhalant), sodium chloride flush, magnesium hydroxide, glucose, dextrose, glucagon (rDNA), dextrose      Physical Exam:    VITALS:  BP (!) 91/48   Pulse 92   Temp 98 °F (36.7 °C) (Axillary)   Resp 20   Ht 5' 5\" (1.651 m)   Wt 181 lb 3.2 oz (82.2 kg)   SpO2 99%   BMI 30.15 kg/m²   24HR INTAKE/OUTPUT:      Intake/Output Summary (Last 24 hours) at 12/14/18 1114  Last data filed at 12/14/18 0909   Gross per 24 hour   Intake              470 ml   Output             1481 ml   Net            -1011 ml       Access:  Peripherals   Constitutional: awake, alert and oriented X3    HEENT:  EMOI, PERRLA, neck is supple   NECK: normal range of motion and neck is supple   Respiratory:  Bilateral crackles   Cardiovascular/Edema:  RRR, no murmurs, has elevated JVD and galloping

## 2018-12-14 NOTE — FLOWSHEET NOTE
Spoke to [facility], waiting on pt transfer to initiate ccpd. States Dr Arlander Goodpasture just came to see pt  Should transfer soon.  Asked pt put in stat, this is delaying his treatment starting at 1800

## 2018-12-14 NOTE — PROGRESS NOTES
Infectious Disease  Progress Note  [facility]    Chief Complaint: SEPSIS - NEC PNEUMONIA    Subjective:  Patient is awake alert, WEAK. There's been no fevers chills or night sweats. No drainage from his eyes ears nose and throat; no nausea or vomiting,  diarrhea hematemesis or hematochezia. No chest pain or palpitations. No abdominal pain. No frequency burning or hematuria.  + SOB, +cough,- productive sputum or hemoptysis. No focal motor sensory loss or psychiatric problems. Negative for new endocrine issues and no blood dyscrasias. . No rash.   RESTING - BUT APPEARS LITTLE STRONGER AFTER BAL TO CLEAR SECRETIONS  IJ OUT  Scheduled Meds:   [START ON 12/18/2018] darbepoetin hayde-polysorbate  60 mcg Subcutaneous Weekly    diltiazem  60 mg Oral 3 times per day    sevelamer  800 mg Oral TID WC    bumetanide  2 mg Oral BID    [START ON 12/6/2019] warfarin (COUMADIN) daily dosing (placeholder)   Other RX Placeholder    insulin lispro  0-18 Units Subcutaneous TID WC    insulin lispro  0-9 Units Subcutaneous Nightly    fluconazole  100 mg Intravenous Q24H    levalbuterol  0.63 mg Nebulization Q4H While awake    ertapenem (INVANZ) IVPB  500 mg Intravenous Q24H    pantoprazole  40 mg Oral QAM AC    budesonide  500 mcg Nebulization BID    formoterol  20 mcg Nebulization BID    sodium chloride flush  10 mL Intravenous 2 times per day     Continuous Infusions:   dextrose       PRN Meds:acetaminophen, sodium chloride (Inhalant), sodium chloride flush, magnesium hydroxide, glucose, dextrose, glucagon (rDNA), dextrose    Patient Vitals for the past 24 hrs:   BP Temp Temp src Pulse Resp SpO2 Weight   12/14/18 1600 (!) 88/52 98.6 °F (37 °C) Temporal 99 14 (!) 4 % -   12/14/18 0845 (!) 91/48 98 °F (36.7 °C) Axillary 92 20 99 % -   12/14/18 0616 - - - - - - 181 lb 3.2 oz (82.2 kg)   12/14/18 0615 101/62 - - - - - -   12/13/18 2315 (!) 98/58 98 °F (36.7 °C) Oral 110 20 98 % -   12/13/18 2218 - - - - 25 - -   12/13/18 2156 appearance. Left apical subpleural opacity and possible pleural thickening without   significant change. Diffuse interstitial prominence suggestive of pulmonary edema. XR CHEST PORTABLE   Final Result      Lines, tubes, and devices:  None. Lungs and pleura:  Again identified is a consolidative opacity in the   right upper lobe at is relatively stable from the prior examination. Increased reticular markings particularly in the right lung could   reflect edema or inflammatory change. There is probably small   bilateral pleural effusions. No left lung airspace disease. Cardiomediastinal silhouette:  Unchanged cardiomediastinal silhouette. Other:  Degenerative changes of both glenohumeral joints with rotator   cuff arthropathy on the right         XR CHEST PORTABLE   Final Result   Findings compatible with atherosclerotic disease    No significant interval change                  XR CHEST PORTABLE   Final Result   Infiltrates in the lungs and of increased in severity                  XR CHEST PORTABLE   Final Result      1. Removal right internal jugular central line. Slight improving   aeration right upper lobe. Otherwise, no change. XR CHEST PORTABLE   Final Result      1. No change. XR CHEST PORTABLE   Final Result   Tortuous ectatic aorta   Cardiomegaly   Airspace disease compatible with pneumonia most severe involving the   right upper lung   The chest appears improved in the interval                  XR CHEST PORTABLE   Final Result   Stable abnormal chest with diffuse infiltrates in the right lung which   may be due to diffuse pneumonia. Underlying malignancy is not excluded   and surveillance is recommended. XR CHEST PORTABLE   Final Result      Interval  worsening of the consolidation in the right upper lobe.                      XR CHEST PORTABLE   Final Result   Airspace disease in the right lung compatible with pneumonia with   slight improvement since the

## 2018-12-15 NOTE — PLAN OF CARE
Problem: Risk for Impaired Skin Integrity  Goal: Tissue integrity - skin and mucous membranes  Structural intactness and normal physiological function of skin and  mucous membranes.    Outcome: Ongoing      Problem: Falls - Risk of:  Goal: Will remain free from falls  Will remain free from falls   Outcome: Met This Shift      Problem: Airway Clearance - Ineffective:  Goal: Clear lung sounds  Clear lung sounds   Outcome: Ongoing

## 2018-12-15 NOTE — DISCHARGE SUMMARY
instructed by your doctorHistorical Med      JANUVIA 50 MG tablet Take 50 mg by mouth daily , MARI AC      calcitRIOL (ROCALTROL) 0.25 MCG capsule Take 0.25 mcg by mouth daily Historical Med      albuterol (PROVENTIL) (2.5 MG/3ML) 0.083% nebulizer solution Take 2.5 mg by nebulization daily Historical Med      simvastatin (ZOCOR) 20 MG tablet Take 20 mg by mouth nightly.            STOP taking these medications       furosemide (LASIX) 40 MG tablet Comments:   Reason for Stopping:             Activity: activity as tolerated  Diet: regular diet  Wound Care: none needed    Signed:  Mj Ulloa MD.  12/15/2018  7:48 AM

## 2019-01-14 LAB
FUNGUS (MYCOLOGY) CULTURE: NORMAL
FUNGUS STAIN: NORMAL

## 2019-01-29 LAB
AFB CULTURE (MYCOBACTERIA): NORMAL
AFB SMEAR: NORMAL

## 2019-05-10 LAB
EKG ATRIAL RATE: 111 BPM
EKG Q-T INTERVAL: 356 MS
EKG QRS DURATION: 118 MS
EKG QTC CALCULATION (BAZETT): 477 MS
EKG R AXIS: -22 DEGREES
EKG T AXIS: 62 DEGREES
EKG VENTRICULAR RATE: 108 BPM